# Patient Record
Sex: MALE | Race: WHITE | NOT HISPANIC OR LATINO | Employment: FULL TIME | URBAN - NONMETROPOLITAN AREA
[De-identification: names, ages, dates, MRNs, and addresses within clinical notes are randomized per-mention and may not be internally consistent; named-entity substitution may affect disease eponyms.]

---

## 2018-08-11 ENCOUNTER — HOSPITAL ENCOUNTER (EMERGENCY)
Facility: HOSPITAL | Age: 48
Discharge: HOME | End: 2018-08-11
Attending: STUDENT IN AN ORGANIZED HEALTH CARE EDUCATION/TRAINING PROGRAM | Admitting: STUDENT IN AN ORGANIZED HEALTH CARE EDUCATION/TRAINING PROGRAM

## 2018-08-11 ENCOUNTER — APPOINTMENT (OUTPATIENT)
Dept: CT IMAGING | Facility: HOSPITAL | Age: 48
End: 2018-08-11

## 2018-08-11 VITALS
DIASTOLIC BLOOD PRESSURE: 96 MMHG | SYSTOLIC BLOOD PRESSURE: 163 MMHG | HEIGHT: 74 IN | OXYGEN SATURATION: 98 % | TEMPERATURE: 99 F | HEART RATE: 83 BPM | RESPIRATION RATE: 18 BRPM | BODY MASS INDEX: 40.43 KG/M2 | WEIGHT: 315 LBS

## 2018-08-11 DIAGNOSIS — R10.9 NON-SURGICAL ABDOMINAL PAIN: Primary | ICD-10-CM

## 2018-08-11 PROCEDURE — 99282 EMERGENCY DEPT VISIT SF MDM: CPT

## 2018-08-11 PROCEDURE — 81003 URINALYSIS AUTO W/O SCOPE: CPT | Performed by: STUDENT IN AN ORGANIZED HEALTH CARE EDUCATION/TRAINING PROGRAM

## 2018-08-11 PROCEDURE — 85025 COMPLETE CBC W/AUTO DIFF WBC: CPT | Performed by: STUDENT IN AN ORGANIZED HEALTH CARE EDUCATION/TRAINING PROGRAM

## 2018-08-11 PROCEDURE — 74176 CT ABD & PELVIS W/O CONTRAST: CPT

## 2018-08-11 PROCEDURE — 80053 COMPREHEN METABOLIC PANEL: CPT | Performed by: STUDENT IN AN ORGANIZED HEALTH CARE EDUCATION/TRAINING PROGRAM

## 2018-08-12 LAB
ALBUMIN SERPL-MCNC: 4.5 G/DL (ref 3.5–5)
ALBUMIN/GLOB SERPL: 1.4 G/DL (ref 1–2)
ALP SERPL-CCNC: 76 U/L (ref 38–126)
ALT SERPL W P-5'-P-CCNC: 34 U/L (ref 13–69)
ANION GAP SERPL CALCULATED.3IONS-SCNC: 12.8 MMOL/L (ref 10–20)
AST SERPL-CCNC: 25 U/L (ref 15–46)
BASOPHILS # BLD AUTO: 0.04 10*3/MM3 (ref 0–0.2)
BASOPHILS NFR BLD AUTO: 0.4 % (ref 0–2.5)
BILIRUB SERPL-MCNC: 1.9 MG/DL (ref 0.2–1.3)
BILIRUB UR QL STRIP: NEGATIVE
BUN BLD-MCNC: 16 MG/DL (ref 7–20)
BUN/CREAT SERPL: 20 (ref 6.3–21.9)
CALCIUM SPEC-SCNC: 9.5 MG/DL (ref 8.4–10.2)
CHLORIDE SERPL-SCNC: 99 MMOL/L (ref 98–107)
CLARITY UR: CLEAR
CO2 SERPL-SCNC: 28 MMOL/L (ref 26–30)
COLOR UR: YELLOW
CREAT BLD-MCNC: 0.8 MG/DL (ref 0.6–1.3)
DEPRECATED RDW RBC AUTO: 42 FL (ref 37–54)
EOSINOPHIL # BLD AUTO: 0.14 10*3/MM3 (ref 0–0.7)
EOSINOPHIL NFR BLD AUTO: 1.2 % (ref 0–7)
ERYTHROCYTE [DISTWIDTH] IN BLOOD BY AUTOMATED COUNT: 12.2 % (ref 11.5–14.5)
GFR SERPL CREATININE-BSD FRML MDRD: 103 ML/MIN/1.73
GLOBULIN UR ELPH-MCNC: 3.2 GM/DL
GLUCOSE BLD-MCNC: 108 MG/DL (ref 74–98)
GLUCOSE UR STRIP-MCNC: NEGATIVE MG/DL
HCT VFR BLD AUTO: 43.8 % (ref 42–52)
HGB BLD-MCNC: 15.5 G/DL (ref 14–18)
HGB UR QL STRIP.AUTO: NEGATIVE
IMM GRANULOCYTES # BLD: 0.04 10*3/MM3 (ref 0–0.06)
IMM GRANULOCYTES NFR BLD: 0.4 % (ref 0–0.6)
KETONES UR QL STRIP: NEGATIVE
LEUKOCYTE ESTERASE UR QL STRIP.AUTO: NEGATIVE
LYMPHOCYTES # BLD AUTO: 4.43 10*3/MM3 (ref 0.6–3.4)
LYMPHOCYTES NFR BLD AUTO: 38.9 % (ref 10–50)
MCH RBC QN AUTO: 32.8 PG (ref 27–31)
MCHC RBC AUTO-ENTMCNC: 35.4 G/DL (ref 30–37)
MCV RBC AUTO: 92.6 FL (ref 80–94)
MONOCYTES # BLD AUTO: 0.82 10*3/MM3 (ref 0–0.9)
MONOCYTES NFR BLD AUTO: 7.2 % (ref 0–12)
NEUTROPHILS # BLD AUTO: 5.92 10*3/MM3 (ref 2–6.9)
NEUTROPHILS NFR BLD AUTO: 51.9 % (ref 37–80)
NITRITE UR QL STRIP: NEGATIVE
NRBC BLD MANUAL-RTO: 0 /100 WBC (ref 0–0)
PH UR STRIP.AUTO: 5.5 [PH] (ref 5–8)
PLATELET # BLD AUTO: 215 10*3/MM3 (ref 130–400)
PMV BLD AUTO: 9.1 FL (ref 6–12)
POTASSIUM BLD-SCNC: 3.8 MMOL/L (ref 3.5–5.1)
PROT SERPL-MCNC: 7.7 G/DL (ref 6.3–8.2)
PROT UR QL STRIP: NEGATIVE
RBC # BLD AUTO: 4.73 10*6/MM3 (ref 4.7–6.1)
SODIUM BLD-SCNC: 136 MMOL/L (ref 137–145)
SP GR UR STRIP: >=1.03 (ref 1–1.03)
UROBILINOGEN UR QL STRIP: NORMAL
WBC NRBC COR # BLD: 11.39 10*3/MM3 (ref 4.8–10.8)

## 2018-08-26 NOTE — ED PROVIDER NOTES
Subjective   Patient's 48-year-old male presents with concerns for appendicitis.  Patient states he is had sharp right-sided abdominal pain for the past 2-3 days.  States sometimes he feels it in his back.  He does have an umbilical hernia but is not what is causing his pain.  States pain has made it difficult for him to sleep.  Nothing makes the pain better or worse.            Review of Systems   All other systems reviewed and are negative.      History reviewed. No pertinent past medical history.    No Known Allergies    History reviewed. No pertinent surgical history.    History reviewed. No pertinent family history.    Social History     Social History   • Marital status: Single     Social History Main Topics   • Smoking status: Never Smoker   • Alcohol use No   • Drug use: No     Other Topics Concern   • Not on file           Objective   Physical Exam   Nursing note and vitals reviewed.    GEN: No acute distress  Head: Normocephalic, atraumatic  Eyes: Pupils equal round reactive to light  ENT: Posterior pharynx normal in appearance, oral mucosa is moist  Chest: Nontender to palpation  Cardiovascular: Regular rate  Lungs: Clear to auscultation bilaterally  Abdomen: Soft, mildly tender to palpation in the right upper quadrant otherwise nontender, repeat right lower quadrant and left lower quadrant are nontender, nondistended, no peritoneal signs  Extremities: No edema, normal appearance  Neuro: GCS 15  Psych: Mood and affect are appropriate    Procedures           ED Course                  MDM  Number of Diagnoses or Management Options  Diagnosis management comments:    CT Abdomen Pelvis Without Contrast (Final result)   Result time 08/11/18 23:15:22   Final result by Emanuel Flores MD (08/11/18 23:15:22)             Impression:     No acute abnormality    Authenticated by Emanuel Flores MD on 08/11/2018 11:15:22 PM        Narrative:     FINAL REPORT    TECHNIQUE:  Multiple contiguous transaxial slices  through the abdomen and  pelvis were obtained without the intravenous administration of  contrast.    CLINICAL HISTORY:  right sided abdominal pain, kidney stone?    FINDINGS:  There is no renal or ureteral stone or hydronephrosis. The  bladder is normal in contour.  The liver, gallbladder, spleen,  pancreas and adrenal glands appear normal. The bowel gas pattern  is nonobstructive. No focal inflammatory changes are present.  The appendix appears normal.  There are subumbilical hernia  containing fat.      While awaiting blood work after CT scan the patient eloped.  I did tell him about his CT scan results and he did seem disappointed that he did not have appendicitis.  Blood work was pending at the time         Amount and/or Complexity of Data Reviewed  Clinical lab tests: reviewed  Decide to obtain previous medical records or to obtain history from someone other than the patient: yes  Obtain history from someone other than the patient: yes  Review and summarize past medical records: yes  Independent visualization of images, tracings, or specimens: yes          Final diagnoses:   Non-surgical abdominal pain            Gonzales Jackman MD  08/26/18 0714

## 2024-06-23 ENCOUNTER — HOSPITAL ENCOUNTER (EMERGENCY)
Facility: HOSPITAL | Age: 54
Discharge: HOME | End: 2024-06-23
Attending: STUDENT IN AN ORGANIZED HEALTH CARE EDUCATION/TRAINING PROGRAM | Admitting: STUDENT IN AN ORGANIZED HEALTH CARE EDUCATION/TRAINING PROGRAM
Payer: COMMERCIAL

## 2024-06-23 ENCOUNTER — APPOINTMENT (OUTPATIENT)
Dept: CT IMAGING | Facility: HOSPITAL | Age: 54
End: 2024-06-23
Payer: COMMERCIAL

## 2024-06-23 VITALS
DIASTOLIC BLOOD PRESSURE: 92 MMHG | OXYGEN SATURATION: 96 % | RESPIRATION RATE: 16 BRPM | SYSTOLIC BLOOD PRESSURE: 163 MMHG | HEART RATE: 70 BPM | WEIGHT: 299 LBS | TEMPERATURE: 97.8 F | BODY MASS INDEX: 38.37 KG/M2 | HEIGHT: 74 IN

## 2024-06-23 DIAGNOSIS — M54.2 NECK PAIN: Primary | ICD-10-CM

## 2024-06-23 DIAGNOSIS — M54.12 CERVICAL RADICULOPATHY: ICD-10-CM

## 2024-06-23 PROCEDURE — 72125 CT NECK SPINE W/O DYE: CPT

## 2024-06-23 PROCEDURE — 99284 EMERGENCY DEPT VISIT MOD MDM: CPT

## 2024-06-23 RX ORDER — CYCLOBENZAPRINE HCL 10 MG
10 TABLET ORAL 3 TIMES DAILY PRN
Qty: 21 TABLET | Refills: 0 | Status: SHIPPED | OUTPATIENT
Start: 2024-06-23

## 2024-06-23 RX ORDER — PREDNISONE 20 MG/1
40 TABLET ORAL DAILY
Qty: 10 TABLET | Refills: 0 | Status: SHIPPED | OUTPATIENT
Start: 2024-06-23 | End: 2024-06-28
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
## 2024-06-27 ENCOUNTER — LAB (OUTPATIENT)
Dept: LAB | Facility: HOSPITAL | Age: 54
End: 2024-06-27
Payer: COMMERCIAL

## 2024-06-27 ENCOUNTER — OFFICE VISIT (OUTPATIENT)
Dept: PSYCHIATRY | Facility: CLINIC | Age: 54
End: 2024-06-27
Payer: COMMERCIAL

## 2024-06-27 VITALS
SYSTOLIC BLOOD PRESSURE: 154 MMHG | HEART RATE: 74 BPM | BODY MASS INDEX: 38.5 KG/M2 | WEIGHT: 300 LBS | OXYGEN SATURATION: 95 % | HEIGHT: 74 IN | DIASTOLIC BLOOD PRESSURE: 76 MMHG

## 2024-06-27 DIAGNOSIS — F12.20 CANNABIS USE DISORDER, MODERATE, DEPENDENCE: ICD-10-CM

## 2024-06-27 DIAGNOSIS — F10.20 ALCOHOL USE DISORDER, MODERATE, DEPENDENCE: ICD-10-CM

## 2024-06-27 DIAGNOSIS — F15.20 METHAMPHETAMINE USE DISORDER, MODERATE: Primary | ICD-10-CM

## 2024-06-27 DIAGNOSIS — F15.20 METHAMPHETAMINE USE DISORDER, MODERATE: ICD-10-CM

## 2024-06-27 LAB
AMPHET+METHAMPHET UR QL: NEGATIVE
AMPHETAMINES UR QL: NEGATIVE
BARBITURATES UR QL SCN: NEGATIVE
BENZODIAZ UR QL SCN: NEGATIVE
BUPRENORPHINE SERPL-MCNC: NEGATIVE NG/ML
CANNABINOIDS SERPL QL: NEGATIVE
COCAINE UR QL: NEGATIVE
METHADONE UR QL SCN: NEGATIVE
OPIATES UR QL: NEGATIVE
OXYCODONE UR QL SCN: NEGATIVE
PCP UR QL SCN: NEGATIVE
TRICYCLICS UR QL SCN: NEGATIVE

## 2024-06-27 PROCEDURE — 80306 DRUG TEST PRSMV INSTRMNT: CPT

## 2024-06-27 NOTE — PROGRESS NOTES
"     New Patient Office Visit        Patient Name: Pieter William  : 1970   MRN: [id number]     Referring Provider: Provider, No Known    Chief Complaint: Substance use    History of Present Illness:   Pieter William is a 54 y.o. male who is here today for initial evaluation with provider related to substance use. Initial substance at age 14 with marijuana.  Use slowly increased over time and was smoking daily by college age.  At peak was smoking 2-3 joints per day.  Currently smokes small amount very seldom with last intake about 2 months ago.  Initially reports alcohol use was social and seldom.  When reviewing the DSM-V criteria with him, alcohol use seems to be more problematic than initially reported.  Currently drinks very seldom with last intake 2024.  Methamphetamine use began around age 49.  Peaked at moping a quarter gram 3 to 4 days a week and continued at this pace until 2024.  Denies any intake since then.  Never cigarette smoker.  Denies any current cravings for any substance.  Cravings in the past for methamphetamine have been triggered by fatigue and needing a shot of energy.  Denies any previous DIAN treatment.  Identifies sober support as his ex-wife.  Motivated to sobriety by \"my family \".    Denies any previous psychiatric history.  No prior pharmacological intervention in the past.  Feels mood overall is good and sleeps well most nights.  Has some situational stressors but is coping well overall.  Denies prior psychiatric hospitalizations. Denies SI/HI, AVH.  No family history of DIAN or mental health issues.    Denies any significant past medical history.  Currently does not have a PCP but would like to schedule for an annual wellness exam and screenings.     Currently lives in [place] with his ex-wife.  Has 1 grown stepdaughter and 2 grandchildren that he has a close relationship with.  Born in [facility] and raised by both parents.  Has 6 siblings.  He is fifth " in the birth order. Highest level of education completed was bachelor's degree in history from [employer].  Not currently employed.  Was laid off on Tuesday. License is active and has a vehicle. Current legal issues was arrested on 5/2024 on a failure to appear from [facility].  Had methamphetamine on him at the time and was charged with possession of methamphetamine.  [facility] case was dismissed.  Another case from [facility] was dismissed.  Current charges are only drug or alcohol related charges.  Charges are out of [facility].  is Mario Lanza. Next court date is 7/17/2024.    Triggers: Fatigue    Cravings: Denies any current cravings for any substance    Relapse Prevention: IOP, recovery meetings, peer support    Urine Drug Screen (today's visit) discussed: Ordered, will go after visit today    UDS Confirmation: N/A    EDGARDO (PDMP) Reviewed for Current/Active Medications: No active medication    DSM 5 Substance Use Disorder Checklist     Diagnostic Criteria   (Substance use disorder requires at least 2 criteria be met within 12 month period)   Meets Criteria          Yes / No  Notes/Supporting Information    Substance often taken in larger amounts or over a longer period than intended.  yes Methamphetamine (current)  Marijuana and alcohol (in the past)   2.  There is a persistent desire or unsuccessful effort to cut        down or control th substance use.  no    3.  A great deal of time is spent in activities necessary to        obtain the substance, use the substance, or recover        from its effects.  no    4.  Craving or a strong desire to use the substance.  yes Methamphetamine only   5.  Recurrent substance use resulting in failure to fulfill        major role obligations at work, school, or home.  yes Methamphetamine (current)  Marijuana and alcohol (in the past)   6.  Continued substance use despite having persistent or         recurrent social or interpersonal problems  caused or         exacerbated by the effects of the substance.  yes Methamphetamine (current)  Marijuana and alcohol (in the past)   7.   Important social, occupational or recreational activities        are given up or reduced because of substance use.  no    8.   Recurrent substance use in situations in which it is        physically hazardous.  no    9.  Continued use despite knowledge of having a         persistent or recurrent physical or psychological         problem that is likely to have been caused or        exacerbated by the substance.  yes Marijuana and alcohol (in the past)   10. * Tolerance, as defined by either of the following:          a. A need for markedly increased amounts of the              Substance to achieve intoxication or desired effect.          b. Markedly diminished effect with continued use of              The same substance.  yes Methamphetamine (current)  Marijuana and alcohol (in the past)   11.  * Withdrawal, as manifested by either of the following:         a. The characteristic withdrawal for the substance.          b. The same (or closely related) substance is taken to               Relieve or avoid withdrawal symptoms.  no    **This criterion is not considered to be met for those individuals taking prescriptions opiates solely under medical supervision. **   Severity: Mild: 2-3 symptoms, Moderate: 4-5 symptoms, Severe: 6 or more symptoms.          Past Surgical History:  History reviewed. No pertinent surgical history.    Problem List:  There is no problem list on file for this patient.      Allergy:   No Known Allergies     Current Medications:   Current Outpatient Medications   Medication Sig Dispense Refill    cyclobenzaprine (FLEXERIL) 10 MG tablet Take 1 tablet by mouth 3 (Three) Times a Day As Needed for Muscle Spasms. 21 tablet 0    predniSONE (DELTASONE) 20 MG tablet Take 2 tablets by mouth Daily for 5 days. 10 tablet 0     No current facility-administered medications for  this visit.       Past Medical History:  History reviewed. No pertinent past medical history.    Social History:  Social History     Socioeconomic History    Marital status: Single   Tobacco Use    Smoking status: Never     Passive exposure: Never    Smokeless tobacco: Never   Vaping Use    Vaping status: Never Used   Substance and Sexual Activity    Alcohol use: No    Drug use: No    Sexual activity: Defer       Family History:  History reviewed. No pertinent family history.      Subjective      Review of Systems:   Review of Systems   Constitutional:  Negative for chills, fatigue and fever.   Respiratory:  Negative for shortness of breath.    Cardiovascular:  Negative for chest pain.   Gastrointestinal:  Negative for abdominal pain.   Skin:  Negative for skin lesions.   Neurological:  Negative for seizures and confusion.   Psychiatric/Behavioral:  Positive for stress. Negative for hallucinations, sleep disturbance, suicidal ideas and depressed mood. The patient is not nervous/anxious.        PHQ-9 Depression Screening  Little interest or pleasure in doing things? 0-->not at all   Feeling down, depressed, or hopeless? 0-->not at all   Trouble falling or staying asleep, or sleeping too much? 0-->not at all   Feeling tired or having little energy? 0-->not at all   Poor appetite or overeating? 0-->not at all   Feeling bad about yourself - or that you are a failure or have let yourself or your family down? 0-->not at all   Trouble concentrating on things, such as reading the newspaper or watching television? 0-->not at all   Moving or speaking so slowly that other people could have noticed? Or the opposite - being so fidgety or restless that you have been moving around a lot more than usual? 0-->not at all   Thoughts that you would be better off dead, or of hurting yourself in some way? 0-->not at all   PHQ-9 Total Score 0   If you checked off any problems, how difficult have these problems made it for you to do your  work, take care of things at home, or get along with other people? not difficult at all       IRINEO-7 Score:   Feeling nervous, anxious or on edge: Not at all  Not being able to stop or control worrying: Not at all  Worrying too much about different things: Not at all  Trouble Relaxing: Not at all  Being so restless that it is hard to sit still: Not at all  Feeling afraid as if something awful might happen: Not at all  Becoming easily annoyed or irritable: Not at all  IRINEO 7 Total Score: 0  If you checked any problems, how difficult have these problems made it for you to do your work, take care of things at home, or get along with other people: Not difficult at all    Patient History:   The following portions of the patient's history were reviewed and updated as appropriate: allergies, current medications, past family history, past medical history, past social history, past surgical history and problem list.     Social:  Social History     Socioeconomic History    Marital status: Single   Tobacco Use    Smoking status: Never     Passive exposure: Never    Smokeless tobacco: Never   Vaping Use    Vaping status: Never Used   Substance and Sexual Activity    Alcohol use: No    Drug use: No    Sexual activity: Defer       Medications:     Current Outpatient Medications:     cyclobenzaprine (FLEXERIL) 10 MG tablet, Take 1 tablet by mouth 3 (Three) Times a Day As Needed for Muscle Spasms., Disp: 21 tablet, Rfl: 0    predniSONE (DELTASONE) 20 MG tablet, Take 2 tablets by mouth Daily for 5 days., Disp: 10 tablet, Rfl: 0    Objective     Physical Exam  Vitals reviewed.   Constitutional:       General: He is not in acute distress.     Appearance: He is well-developed. He is not ill-appearing.   Pulmonary:      Effort: No respiratory distress.   Neurological:      Mental Status: He is alert and oriented to person, place, and time.      Gait: Gait normal.   Psychiatric:         Attention and Perception: Attention normal.          "Mood and Affect: Mood and affect normal.         Speech: Speech normal.         Behavior: Behavior normal. Behavior is cooperative.         Thought Content: Thought content is not paranoid or delusional. Thought content does not include homicidal or suicidal ideation. Thought content does not include homicidal or suicidal plan.         Cognition and Memory: Cognition and memory normal.         Vital Signs:   Vitals:    06/27/24 1057   BP: 154/76   Pulse: 74   SpO2: 95%   Weight: 136 kg (300 lb)   Height: 188 cm (74\")     Body mass index is 38.52 kg/m².     Mental Status Exam:   Hygiene:   good  Cooperation:  Cooperative  Eye Contact:  Good  Psychomotor Behavior:  Restless  Affect:  Full range  Mood: normal  Speech:  Normal  Thought Process:  Goal directed  Thought Content:  Normal  Suicidal:  None  Homicidal:  None  Hallucinations:  None  Delusion:  None  Memory:  Intact  Orientation:  Person, Place, Time, and Situation  Reliability:  fair  Insight:  Fair  Judgement:  Fair  Impulse Control:  Fair    Assessment / Plan      -This is my initial evaluation with Pieter. Based on self-reported substance use history and current symptom burden, CD-IOP has been advised and screener appointment scheduled with Fabián Martinez LCSW to complete the intake process.   -Encouraged to take part in and remain active in 12 Step Recovery Meetings, IOP, and/or 1:1 therapy/counseling and to establish/maintain an active relationship with a recovery sponsor as part of their long-term recovery care. Recovery meeting list for [facility]. Discussed other recovery related materials.  -Instructed to go to lab today to complete baseline UDS. Agreeable to continued monitoring and will request confirmations with levels on all preliminary positive results for patient safety, medication compliance, adherence to treatment plan, toxicity monitoring (when applicable), and planning of future care.   -RICARDO signed for [facility] and referral placed for " peer support. Declines TCM. Insurance support contact info given  -RICARDO signed for [facility] and private atty.  -Narcan sample declined.   -Dr. Sullivan's contact info given and he will schedule to establish care and wellness exam with preventative screenings.  -Feels he is coping well with situational stressors.  Denies any need for pharmacological intervention for mood.  Is aware that individual therapy services are available to him at any time.      Assessment & Plan   Problems Addressed this Visit    None  Visit Diagnoses       Methamphetamine use disorder, moderate    -  Primary    Relevant Orders    Urine Drug Screen - Supervised - Urine, Clean Catch    [facility] Tox - Urine, Clean Catch    Cannabis use disorder, moderate, dependence        Relevant Orders    Urine Drug Screen - Supervised - Urine, Clean Catch    [facility] Tox - Urine, Clean Catch    Alcohol use disorder, moderate, dependence        Relevant Orders    Urine Drug Screen - Supervised - Urine, Clean Catch    [facility] Tox - Urine, Clean Catch          Diagnoses         Codes Comments    Methamphetamine use disorder, moderate    -  Primary ICD-10-CM: F15.20  ICD-9-CM: 304.40     Cannabis use disorder, moderate, dependence     ICD-10-CM: F12.20  ICD-9-CM: 304.30     Alcohol use disorder, moderate, dependence     ICD-10-CM: F10.20  ICD-9-CM: 303.90             Visit Diagnoses:    ICD-10-CM ICD-9-CM   1. Methamphetamine use disorder, moderate  F15.20 304.40   2. Cannabis use disorder, moderate, dependence  F12.20 304.30   3. Alcohol use disorder, moderate, dependence  F10.20 303.90       PLAN:  Safety: No acute safety concerns  Risk Assessment: Risk of self-harm acutely is low. Risk of self-harm chronically is also low, but could be further elevated in the event of treatment noncompliance and/or AODA.    TREATMENT PLAN: Continue supportive psychotherapy efforts and  medications as indicated. Treatment and medication options discussed during today's visit. Patient acknowledged and verbally consented to continue with current treatment plan and was educated on the importance of compliance with treatment and follow-up appointments.    GOALS:  Short Term Goals: Patient will be compliant with medication, and patient will have no significant medication related side effects.  Patient will be engaged in psychotherapy as indicated.  Patient will report subjective improvement of symptoms.  Long term goals: To stabilize mood and treat/improve subjective symptoms, the patient will stay out of the hospital, the patient will be at an optimal level of functioning, and the patient will take all medications as prescribed.  The patient/guardian verbalized understanding and agreement with goals that were mutually set.    MEDICATION ISSUES:  EDGARDO reviewed as expected.  Discussed medication options and treatment plan of prescribed medication as well as the risks, benefits, and side effects including potential falls, possible impaired driving and metabolic adversities among others. Patient is agreeable to call the office with any worsening of symptoms or onset of side effects. Patient is agreeable to call 911 or go to the nearest ER should he/she begin having SI/HI. No medication side effects or related complaints today.       TOBACCO USE:  Never smoker    I advised Pieter William of the risks of tobacco use.     MEDS ORDERED DURING VISIT:  No orders of the defined types were placed in this encounter.      Return if symptoms worsen or fail to improve.                 This document has been electronically signed by SANCHEZ Riddle  June 27, 2024 11:46 EDT      Part of this note may be an electronic transcription/translation of spoken language to printed text using the Dragon Dictation System.

## 2024-07-02 LAB — REF LAB TEST METHOD: NORMAL

## 2024-07-18 ENCOUNTER — OFFICE VISIT (OUTPATIENT)
Dept: PSYCHIATRY | Facility: CLINIC | Age: 54
End: 2024-07-18

## 2024-07-18 DIAGNOSIS — F12.20 MODERATE CANNABIS USE DISORDER: ICD-10-CM

## 2024-07-18 DIAGNOSIS — F15.20 MODERATE METHAMPHETAMINE USE DISORDER: ICD-10-CM

## 2024-07-18 DIAGNOSIS — F10.20 MODERATE ALCOHOL USE DISORDER: Primary | ICD-10-CM

## 2024-07-18 PROCEDURE — 90791 PSYCH DIAGNOSTIC EVALUATION: CPT | Performed by: SOCIAL WORKER
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
## 2024-08-01 ENCOUNTER — APPOINTMENT (OUTPATIENT)
Dept: PSYCHIATRY | Facility: HOSPITAL | Age: 54
End: 2024-08-01
Payer: COMMERCIAL

## 2024-08-05 ENCOUNTER — OFFICE VISIT (OUTPATIENT)
Dept: PSYCHIATRY | Facility: HOSPITAL | Age: 54
End: 2024-08-05
Payer: COMMERCIAL

## 2024-08-05 DIAGNOSIS — F15.20 MODERATE METHAMPHETAMINE USE DISORDER: ICD-10-CM

## 2024-08-05 DIAGNOSIS — F10.20 MODERATE ALCOHOL USE DISORDER: Primary | ICD-10-CM

## 2024-08-05 DIAGNOSIS — F12.20 MODERATE CANNABIS USE DISORDER: ICD-10-CM

## 2024-08-05 PROCEDURE — H0015 ALCOHOL AND/OR DRUG SERVICES: HCPCS | Performed by: COUNSELOR

## 2024-08-07 ENCOUNTER — OFFICE VISIT (OUTPATIENT)
Dept: PSYCHIATRY | Facility: HOSPITAL | Age: 54
End: 2024-08-07
Payer: COMMERCIAL

## 2024-08-07 DIAGNOSIS — F10.20 MODERATE ALCOHOL USE DISORDER: Primary | ICD-10-CM

## 2024-08-07 DIAGNOSIS — F15.20 MODERATE METHAMPHETAMINE USE DISORDER: ICD-10-CM

## 2024-08-07 DIAGNOSIS — F12.20 MODERATE CANNABIS USE DISORDER: ICD-10-CM

## 2024-08-07 PROCEDURE — H0015 ALCOHOL AND/OR DRUG SERVICES: HCPCS | Performed by: NURSE PRACTITIONER

## 2024-08-08 ENCOUNTER — OFFICE VISIT (OUTPATIENT)
Dept: PSYCHIATRY | Facility: HOSPITAL | Age: 54
End: 2024-08-08
Payer: COMMERCIAL

## 2024-08-08 ENCOUNTER — LAB (OUTPATIENT)
Dept: LAB | Facility: HOSPITAL | Age: 54
End: 2024-08-08
Payer: COMMERCIAL

## 2024-08-08 DIAGNOSIS — F10.20 MODERATE ALCOHOL USE DISORDER: Primary | ICD-10-CM

## 2024-08-08 DIAGNOSIS — F15.20 METHAMPHETAMINE USE DISORDER, MODERATE: ICD-10-CM

## 2024-08-08 DIAGNOSIS — F10.20 ALCOHOL USE DISORDER, MODERATE, DEPENDENCE: ICD-10-CM

## 2024-08-08 DIAGNOSIS — F15.20 MODERATE METHAMPHETAMINE USE DISORDER: ICD-10-CM

## 2024-08-08 DIAGNOSIS — F12.20 CANNABIS USE DISORDER, MODERATE, DEPENDENCE: ICD-10-CM

## 2024-08-08 DIAGNOSIS — F12.20 MODERATE CANNABIS USE DISORDER: ICD-10-CM

## 2024-08-08 PROCEDURE — 80306 DRUG TEST PRSMV INSTRMNT: CPT

## 2024-08-08 PROCEDURE — H0015 ALCOHOL AND/OR DRUG SERVICES: HCPCS | Performed by: NURSE PRACTITIONER

## 2024-08-08 NOTE — PROGRESS NOTES
[facility] PHASE I GROUP NOTE     Name: Pieter William  Date: 08/05/2024    Time In: 6:00  Time Out: 9:00     Number of participants: 11    Data: 3 hour IOP group therapy session (Check-ins, team building activity, and triggers to substances)     Check Ins: Therapist continued facilitation of rapport building strategies between group members. Therapist asked that each patient check in with home life and recovery efforts and identify triggers, cravings, and high risk situations that arise between group sessions. Therapist provided empathy and support during group session.     Session Content/Coping Skills: [facility]  attended for first part of session and introduced himself and discussed his role on the [facility] treatment team. Check ins completed by group members. Group members participated in a group profile activity to build group cohesion. Mirza Pond  read the daily reflection. Clinician facilitated a team building activity about looking for the good in the bad and positive affirmation. Clinician facilitated discussion with group regarding triggers.        Response: Patient attended class in person. Patient participated in completion of check in form. Patient on check in form answered no to question of “currently or since your last group meeting, have you had any suicidal thoughts or plan or intent to hurt yourself”, and patient also answered no on check in form to question of “currently or since your last group meeting, have you had any homicidal thoughts or plan or intent to hurt others”. Patient on check in form reported concerns with appetite and medication concerns since last group meeting.     Personal Assessment: 0-10 Scale (10 worst)    Anxiety:  0   Depression:  0   Cravings: 0     Assessment:     ..  No visits with results within 3 Week(s) from this visit.   Latest known visit with results is:   Lab on 06/27/2024   Component Date Value  Ref Range Status    THC, Screen, Urine 06/27/2024 Negative  Negative Final    Phencyclidine (PCP), Urine 06/27/2024 Negative  Negative Final    Cocaine Screen, Urine 06/27/2024 Negative  Negative Final    Methamphetamine, Ur 06/27/2024 Negative  Negative Final    Opiate Screen 06/27/2024 Negative  Negative Final    Amphetamine Screen, Urine 06/27/2024 Negative  Negative Final    Benzodiazepine Screen, Urine 06/27/2024 Negative  Negative Final    Tricyclic Antidepressants Screen 06/27/2024 Negative  Negative Final    Methadone Screen, Urine 06/27/2024 Negative  Negative Final    Barbiturates Screen, Urine 06/27/2024 Negative  Negative Final    Oxycodone Screen, Urine 06/27/2024 Negative  Negative Final    Buprenorphine, Screen, Urine 06/27/2024 Negative  Negative Final       Mental Status Exam:  Hygiene:  good  Dress: casual  Attitude: cooperative and agreeable   Motor Activity: appropriate  Eye Contact:  good  Speech: regular rate and rhythm   Mood:  calm and cooperative  Affect:  Appropriate  Thought Processes:  Linear  Thought Content:  Mood congruent  Suicidal Thoughts:  denies  Homicidal Thoughts:  denies  Crisis Safety Plan: yes, to come to the emergency room.  Hallucinations:  denies  Reliability: fair  Insight: fair  Judgement: fair  Impulse Control: fair    Recovery/spiritual support group attendance: 0 group meetings     Sponsor: No      Progress toward goal: Not at goal    Prognosis: Fair with Ongoing Treatment     Self-reported number of days sober: Patient's check in sheet reports 71 days    Patient will contact this office, call 911 or present to the nearest emergency room should suicidal or homicidal ideations occur.    Impression/Formulation:    ICD-10-CM ICD-9-CM   1. Moderate alcohol use disorder  F10.20 303.90   2. Moderate cannabis use disorder  F12.20 304.30   3. Moderate methamphetamine use disorder  F15.20 304.40       Clinical Maneuvering/Interventions:Therapist utilized a person-centered  approach to build rapport with group member.  Therapist implemented motivational interviewing techniques to assist client with exploring and resolving ambivalence associated with commitment to change behaviors related to substance use and addiction.  Therapist applied cognitive behavioral strategies to facilitate identification of maladaptive patterns of thinking and behavior that contribute to client's risk for continued substance use and relapse.  Therapist employed group interaction activities to build rapport among group members, promote sobriety, and emphasize relapse prevention.  Therapist promoted safe nonjudgmental environment by providing group members with unconditional positive regard and encouraging group members to comply with group rules and guidelines. Therapist assisted group member with identifying and implementing healthier coping strategies.      Plan:  Continue [facility] IOP Phase I   Aftercare:  [facility] Phase II  Program Assignments:  Personal recovery plan, relapse prevention plan, attendance of recovery support group meetings, exploration of sponsorship, drug/alcohol screens.        TUCKER Rider  [unfilled]  08:40 EDT

## 2024-08-12 ENCOUNTER — OFFICE VISIT (OUTPATIENT)
Dept: PSYCHIATRY | Facility: HOSPITAL | Age: 54
End: 2024-08-12
Payer: COMMERCIAL

## 2024-08-12 DIAGNOSIS — F10.20 MODERATE ALCOHOL USE DISORDER: Primary | ICD-10-CM

## 2024-08-12 DIAGNOSIS — F12.20 MODERATE CANNABIS USE DISORDER: ICD-10-CM

## 2024-08-12 DIAGNOSIS — F15.20 MODERATE METHAMPHETAMINE USE DISORDER: ICD-10-CM

## 2024-08-12 PROCEDURE — H0015 ALCOHOL AND/OR DRUG SERVICES: HCPCS | Performed by: NURSE PRACTITIONER

## 2024-08-14 ENCOUNTER — OFFICE VISIT (OUTPATIENT)
Dept: PSYCHIATRY | Facility: HOSPITAL | Age: 54
End: 2024-08-14
Payer: COMMERCIAL

## 2024-08-14 ENCOUNTER — LAB (OUTPATIENT)
Dept: LAB | Facility: HOSPITAL | Age: 54
End: 2024-08-14
Payer: COMMERCIAL

## 2024-08-14 DIAGNOSIS — F15.20 METHAMPHETAMINE USE DISORDER, MODERATE: ICD-10-CM

## 2024-08-14 DIAGNOSIS — F12.20 CANNABIS USE DISORDER, MODERATE, DEPENDENCE: ICD-10-CM

## 2024-08-14 DIAGNOSIS — F10.20 ALCOHOL USE DISORDER, MODERATE, DEPENDENCE: ICD-10-CM

## 2024-08-14 DIAGNOSIS — F12.20 MODERATE CANNABIS USE DISORDER: ICD-10-CM

## 2024-08-14 DIAGNOSIS — F10.20 MODERATE ALCOHOL USE DISORDER: Primary | ICD-10-CM

## 2024-08-14 DIAGNOSIS — F15.20 MODERATE METHAMPHETAMINE USE DISORDER: ICD-10-CM

## 2024-08-14 PROCEDURE — 80306 DRUG TEST PRSMV INSTRMNT: CPT

## 2024-08-14 PROCEDURE — H0015 ALCOHOL AND/OR DRUG SERVICES: HCPCS | Performed by: NURSE PRACTITIONER

## 2024-08-14 NOTE — PROGRESS NOTES
[facility] IOP GROUP     Date: 08/07/2024  Name: Pieter William    Time In: 1800   Time Out: 2043     Number of participants: 10    IOP GROUP NOTE     Data: 3 hour IOP group therapy session (Check-ins, Coping Skills, Relapse Prevention)     Check Ins: Therapist continued facilitation of rapport building strategies between group members. Therapist asked that each patient check in with home life and recovery efforts and identify triggers, cravings, and high risk situations that arise between group sessions. Therapist provided empathy and support during group session.     Session Content/Coping Skills:  attended for first part of session. Burton from Pharmacy presented on Narcan Education.  shared Just for Today reading.  facilitated discussion of gratitudes and group members shared their gratitudes. Check ins completed by group members. Cravings-Basic Principles- Page one reviewed and discussed (retrieved from taking the escalator). [place] teambuilding activity completed by group members. Clinician discussed with group members the importance of working as a team.     Response: Patient attended class in person. Patient participated in completion of check in form. Patient on check in form answered no to question of “currently or since your last group meeting, have you had any suicidal thoughts or plan or intent to hurt yourself”, and patient also answered no on check in form to question of “currently or since your last group meeting, have you had any homicidal thoughts or plan or intent to hurt others”.     Personal Assessment 0-10 Scale (0-none, 10-high)    Anxiety:  0   Depression:  0   Cravings: 0     Assessment:     ..  No visits with results within 3 Week(s) from this visit.   Latest known visit with results is:   Lab on 06/27/2024   Component Date Value Ref Range Status    THC, Screen, Urine 06/27/2024 Negative  Negative Final     Phencyclidine (PCP), Urine 06/27/2024 Negative  Negative Final    Cocaine Screen, Urine 06/27/2024 Negative  Negative Final    Methamphetamine, Ur 06/27/2024 Negative  Negative Final    Opiate Screen 06/27/2024 Negative  Negative Final    Amphetamine Screen, Urine 06/27/2024 Negative  Negative Final    Benzodiazepine Screen, Urine 06/27/2024 Negative  Negative Final    Tricyclic Antidepressants Screen 06/27/2024 Negative  Negative Final    Methadone Screen, Urine 06/27/2024 Negative  Negative Final    Barbiturates Screen, Urine 06/27/2024 Negative  Negative Final    Oxycodone Screen, Urine 06/27/2024 Negative  Negative Final    Buprenorphine, Screen, Urine 06/27/2024 Negative  Negative Final       Mental Status Exam  Hygiene:  good  Dress: casual  Attitude: cooperative and agreeable   Motor Activity: appropriate  Eye Contact:  good  Speech: regular rate and rhythm   Mood:  calm and cooperative  Affect:  Appropriate  Thought Processes:  Linear  Thought Content:  Normal  Suicidal Thoughts:  denies  Homicidal Thoughts:  denies  Crisis Safety Plan: Safety plan has been discussed.   Hallucinations:  Unknown to clinician.   Reliability: fair  Insight: fair  Judgement: fair  Impulse Control: fair    Recovery/spiritual support group attendance: No.      Progress toward goal: Not at goal    Prognosis: Fair with Ongoing Treatment     Self-reported number of days sober: Patient on check in form reported 73.     Patient will contact this office, call 911 or present to the nearest emergency room should suicidal or homicidal ideations occur.    Impression/Formulation:    ICD-10-CM ICD-9-CM   1. Moderate alcohol use disorder  F10.20 303.90   2. Moderate cannabis use disorder  F12.20 304.30   3. Moderate methamphetamine use disorder  F15.20 304.40       Clinical Maneuvering/Interventions: Therapist utilized a person-centered approach to build rapport with group member. Therapist implemented motivational interviewing techniques to  assist client with exploring and resolving ambivalence associated with commitment to change behaviors related to substance use and addiction. Therapist applied cognitive behavioral strategies to facilitate identification of maladaptive patterns of thinking and behavior that contribute to client's risk for continued substance use and relapse. Therapist employed group interaction activities to build rapport among group members, promote sobriety, and emphasize relapse prevention. Therapist promoted safe nonjudgmental environment by providing group members with unconditional positive regard and encouraging group members to comply with group rules and guidelines. Therapist assisted group member with identifying and implementing healthier coping strategies.      Plan:  Continue [facility] IOP Phase I   Aftercare:  [facility] Phase II  Program Assignments:  Personal recovery plan, relapse prevention plan, attendance of recovery support group meetings, exploration of sponsorship, drug/alcohol screens.     Fabián Martinez LCSW  8/14/2024  16:11 EDT

## 2024-08-15 NOTE — PROGRESS NOTES
[facility] IOP GROUP     Date: 08/08/2024  Name: Pieter William    Time In: 1800   Time Out: Approximately 2040     Number of participants: 10    IOP GROUP NOTE     Data: 3 hour IOP group therapy session (Check-ins, Coping Skills, Relapse Prevention)     Check Ins: Therapist continued facilitation of rapport building strategies between group members. Therapist asked that each patient check in with home life and recovery efforts and identify triggers, cravings, and high risk situations that arise between group sessions. Therapist provided empathy and support during group session.     Session Content/Coping Skills: , Dejah, and Mickey, [facility] Liaison attended for first part of session. Check ins completed by group members. Coping with Cravings- Pages 2 & 3 reviewed and discussed (retrieved from taking the escalator). Clinician explored coping skills with group members. Coping skills log provided. Group members listed coping skills that they could try over the weekend. Clinician showed group members where the phase 2 group of CD-IOP meets in the hospital.    Response: Patient attended class in person. Patient participated in completion of check in form. Patient on check in form answered no to question of “currently or since your last group meeting, have you had any suicidal thoughts or plan or intent to hurt yourself”, and patient also answered no on check in form to question of “currently or since your last group meeting, have you had any homicidal thoughts or plan or intent to hurt others”.     Personal Assessment 0-10 Scale (0-none, 10-high)    Anxiety:  0   Depression:  0   Cravings: 0     Assessment:     ..  Lab on 08/08/2024   Component Date Value Ref Range Status    THC, Screen, Urine 08/08/2024 Negative  Negative Final    Phencyclidine (PCP), Urine 08/08/2024 Negative  Negative Final    Cocaine Screen, Urine 08/08/2024 Negative  Negative Final     Methamphetamine, Ur 08/08/2024 Negative  Negative Final    Opiate Screen 08/08/2024 Negative  Negative Final    Amphetamine Screen, Urine 08/08/2024 Negative  Negative Final    Benzodiazepine Screen, Urine 08/08/2024 Negative  Negative Final    Tricyclic Antidepressants Screen 08/08/2024 Negative  Negative Final    Methadone Screen, Urine 08/08/2024 Negative  Negative Final    Barbiturates Screen, Urine 08/08/2024 Negative  Negative Final    Oxycodone Screen, Urine 08/08/2024 Negative  Negative Final    Buprenorphine, Screen, Urine 08/08/2024 Negative  Negative Final       Mental Status Exam  Hygiene:  good  Dress: casual  Attitude: cooperative and agreeable   Motor Activity: appropriate  Eye Contact:  good  Speech: regular rate and rhythm   Mood:  calm and cooperative  Affect:  Appropriate  Thought Processes:  Linear  Thought Content:  Normal  Suicidal Thoughts:  denies  Homicidal Thoughts:  denies  Crisis Safety Plan: Safety plan has been discussed.   Hallucinations:  Unknown to clinician.   Reliability: fair  Insight: fair  Judgement: fair  Impulse Control: fair    Recovery/spiritual support group attendance: No.      Progress toward goal: Not at goal    Prognosis: Fair with Ongoing Treatment     Self-reported number of days sober: Patient on check in form reported 74.     Patient will contact this office, call 911 or present to the nearest emergency room should suicidal or homicidal ideations occur.    Impression/Formulation:    ICD-10-CM ICD-9-CM   1. Moderate alcohol use disorder  F10.20 303.90   2. Moderate cannabis use disorder  F12.20 304.30   3. Moderate methamphetamine use disorder  F15.20 304.40       Clinical Maneuvering/Interventions: Therapist utilized a person-centered approach to build rapport with group member. Therapist implemented motivational interviewing techniques to assist client with exploring and resolving ambivalence associated with commitment to change behaviors related to substance  use and addiction. Therapist applied cognitive behavioral strategies to facilitate identification of maladaptive patterns of thinking and behavior that contribute to client's risk for continued substance use and relapse. Therapist employed group interaction activities to build rapport among group members, promote sobriety, and emphasize relapse prevention. Therapist promoted safe nonjudgmental environment by providing group members with unconditional positive regard and encouraging group members to comply with group rules and guidelines. Therapist assisted group member with identifying and implementing healthier coping strategies.      Plan:  Continue [facility] IOP Phase I   Aftercare:  [facility] Phase II  Program Assignments:  Personal recovery plan, relapse prevention plan, attendance of recovery support group meetings, exploration of sponsorship, drug/alcohol screens.     Fabián Martinze LCSW  8/15/2024  10:33 EDT

## 2024-08-16 LAB — REF LAB TEST METHOD: NORMAL

## 2024-08-19 NOTE — PROGRESS NOTES
[facility] IOP GROUP     Date: 08/14/2024  Name: Pieter William    Time In: 1802   Time Out: Approximately 2052     Number of participants: 12    IOP GROUP NOTE     Data: 3 hour IOP group therapy session (Check-ins, Coping Skills, Relapse Prevention)     Check Ins: Therapist continued facilitation of rapport building strategies between group members. Therapist asked that each patient check in with home life and recovery efforts and identify triggers, cravings, and high risk situations that arise between group sessions. Therapist provided empathy and support during group session.     Session Content/Coping Skills: Dejah, attended for first part of session.  shared and discussed Just for Today reading. Discussion was facilitated regarding open mindedness. Clinician discussed with group the importance of internal work and discussed the availability of individual therapy to address trauma. Clinician educated group members on the everything AA tamara. Group members finished substance abuse crosswKuldat activity. Living in Balance- Session 1-Part 2 reviewed and discussed.     Response: Patient attended class in person. Patient participated in completion of check in form. Patient on check in form answered no to question of “currently or since your last group meeting, have you had any suicidal thoughts or plan or intent to hurt yourself”, and patient also answered no on check in form to question of “currently or since your last group meeting, have you had any homicidal thoughts or plan or intent to hurt others”.     Personal Assessment 0-10 Scale (0-none, 10-high)    Anxiety:  0   Depression:  0   Cravings: 0     Assessment:     ..  Lab on 08/14/2024   Component Date Value Ref Range Status    THC, Screen, Urine 08/14/2024 Negative  Negative Final    Phencyclidine (PCP), Urine 08/14/2024 Negative  Negative Final    Cocaine Screen, Urine 08/14/2024 Negative  Negative Final     Methamphetamine, Ur 08/14/2024 Negative  Negative Final    Opiate Screen 08/14/2024 Negative  Negative Final    Amphetamine Screen, Urine 08/14/2024 Negative  Negative Final    Benzodiazepine Screen, Urine 08/14/2024 Negative  Negative Final    Tricyclic Antidepressants Screen 08/14/2024 Negative  Negative Final    Methadone Screen, Urine 08/14/2024 Negative  Negative Final    Barbiturates Screen, Urine 08/14/2024 Negative  Negative Final    Oxycodone Screen, Urine 08/14/2024 Negative  Negative Final    Buprenorphine, Screen, Urine 08/14/2024 Negative  Negative Final   Lab on 08/08/2024   Component Date Value Ref Range Status    THC, Screen, Urine 08/08/2024 Negative  Negative Final    Phencyclidine (PCP), Urine 08/08/2024 Negative  Negative Final    Cocaine Screen, Urine 08/08/2024 Negative  Negative Final    Methamphetamine, Ur 08/08/2024 Negative  Negative Final    Opiate Screen 08/08/2024 Negative  Negative Final    Amphetamine Screen, Urine 08/08/2024 Negative  Negative Final    Benzodiazepine Screen, Urine 08/08/2024 Negative  Negative Final    Tricyclic Antidepressants Screen 08/08/2024 Negative  Negative Final    Methadone Screen, Urine 08/08/2024 Negative  Negative Final    Barbiturates Screen, Urine 08/08/2024 Negative  Negative Final    Oxycodone Screen, Urine 08/08/2024 Negative  Negative Final    Buprenorphine, Screen, Urine 08/08/2024 Negative  Negative Final       Mental Status Exam  Hygiene:  good  Dress: casual  Attitude: cooperative and agreeable   Motor Activity: appropriate  Eye Contact:  good  Speech: regular rate and rhythm   Mood:  calm and cooperative  Affect:  Appropriate  Thought Processes:  Linear  Thought Content:  Normal  Suicidal Thoughts:  denies  Homicidal Thoughts:  denies  Crisis Safety Plan: Safety plan has been discussed.   Hallucinations:  Unknown to clinician.   Reliability: fair  Insight: fair  Judgement: fair  Impulse Control: fair    Recovery/spiritual support group  attendance: No.      Progress toward goal: Not at goal    Prognosis: Fair with Ongoing Treatment     Self-reported number of days sober: Patient on check in form reported 82.     Patient will contact this office, call 911 or present to the nearest emergency room should suicidal or homicidal ideations occur.    Impression/Formulation:    ICD-10-CM ICD-9-CM   1. Moderate alcohol use disorder  F10.20 303.90   2. Moderate cannabis use disorder  F12.20 304.30   3. Moderate methamphetamine use disorder  F15.20 304.40       Clinical Maneuvering/Interventions: Therapist utilized a person-centered approach to build rapport with group member. Therapist implemented motivational interviewing techniques to assist client with exploring and resolving ambivalence associated with commitment to change behaviors related to substance use and addiction. Therapist applied cognitive behavioral strategies to facilitate identification of maladaptive patterns of thinking and behavior that contribute to client's risk for continued substance use and relapse. Therapist employed group interaction activities to build rapport among group members, promote sobriety, and emphasize relapse prevention. Therapist promoted safe nonjudgmental environment by providing group members with unconditional positive regard and encouraging group members to comply with group rules and guidelines. Therapist assisted group member with identifying and implementing healthier coping strategies.      Plan:  Continue [facility] IOP Phase I   Aftercare:  [facility] Phase II  Program Assignments:  Personal recovery plan, relapse prevention plan, attendance of recovery support group meetings, exploration of sponsorship, drug/alcohol screens.     Fabián Martinez LCSW  8/19/2024  16:31 EDT

## 2024-08-19 NOTE — PROGRESS NOTES
[facility] IOP GROUP     Date: 08/12/2024  Name: Pieter William    Time In: 1800   Time Out: 2100     Number of participants: 12    IOP GROUP NOTE     Data: 3 hour IOP group therapy session (Check-ins, Coping Skills, Relapse Prevention)     Check Ins: Therapist continued facilitation of rapport building strategies between group members. Therapist asked that each patient check in with home life and recovery efforts and identify triggers, cravings, and high risk situations that arise between group sessions. Therapist provided empathy and support during group session.     Session Content/Coping Skills: Dejah, attended for first part of session. Shitalin explained check in sheet to group members. Clinician also explained to group members the need for negative UDS. Individual treatment plans reviewed with group members. Group members signed the individual treatment plans. Check ins completed by group members. Living in Balance- Session 1- Part 1 reviewed and discussed. Group members began Substance Abuse Crossword activity.     Response: Patient attended class in person. Patient participated in completion of check in form. Patient on check in form answered no to question of “currently or since your last group meeting, have you had any suicidal thoughts or plan or intent to hurt yourself”, and patient also answered no on check in form to question of “currently or since your last group meeting, have you had any homicidal thoughts or plan or intent to hurt others”.     Personal Assessment 0-10 Scale (0-none, 10-high)    Anxiety:  0   Depression:  0   Cravings: 0     Assessment:     ..  Lab on 08/08/2024   Component Date Value Ref Range Status    THC, Screen, Urine 08/08/2024 Negative  Negative Final    Phencyclidine (PCP), Urine 08/08/2024 Negative  Negative Final    Cocaine Screen, Urine 08/08/2024 Negative  Negative Final    Methamphetamine, Ur 08/08/2024 Negative  Negative Final    Opiate Screen  08/08/2024 Negative  Negative Final    Amphetamine Screen, Urine 08/08/2024 Negative  Negative Final    Benzodiazepine Screen, Urine 08/08/2024 Negative  Negative Final    Tricyclic Antidepressants Screen 08/08/2024 Negative  Negative Final    Methadone Screen, Urine 08/08/2024 Negative  Negative Final    Barbiturates Screen, Urine 08/08/2024 Negative  Negative Final    Oxycodone Screen, Urine 08/08/2024 Negative  Negative Final    Buprenorphine, Screen, Urine 08/08/2024 Negative  Negative Final       Mental Status Exam  Hygiene:  good  Dress: casual  Attitude: cooperative and agreeable   Motor Activity: appropriate  Eye Contact:  good  Speech: regular rate and rhythm   Mood:  calm and cooperative  Affect:  Appropriate  Thought Processes:  Linear  Thought Content:  Normal  Suicidal Thoughts:  denies  Homicidal Thoughts:  denies  Crisis Safety Plan: Safety plan has been discussed.   Hallucinations:  Unknown to clinician.   Reliability: fair  Insight: fair  Judgement: fair  Impulse Control: fair    Recovery/spiritual support group attendance: No.      Progress toward goal: Not at goal    Prognosis: Fair with Ongoing Treatment     Self-reported number of days sober: Patient on check in form reported 80.     Patient will contact this office, call 911 or present to the nearest emergency room should suicidal or homicidal ideations occur.    Impression/Formulation:    ICD-10-CM ICD-9-CM   1. Moderate alcohol use disorder  F10.20 303.90   2. Moderate cannabis use disorder  F12.20 304.30   3. Moderate methamphetamine use disorder  F15.20 304.40       Clinical Maneuvering/Interventions: Therapist utilized a person-centered approach to build rapport with group member. Therapist implemented motivational interviewing techniques to assist client with exploring and resolving ambivalence associated with commitment to change behaviors related to substance use and addiction. Therapist applied cognitive behavioral strategies to  facilitate identification of maladaptive patterns of thinking and behavior that contribute to client's risk for continued substance use and relapse. Therapist employed group interaction activities to build rapport among group members, promote sobriety, and emphasize relapse prevention. Therapist promoted safe nonjudgmental environment by providing group members with unconditional positive regard and encouraging group members to comply with group rules and guidelines. Therapist assisted group member with identifying and implementing healthier coping strategies.      Plan:  Continue [facility] IOP Phase I   Aftercare:  [facility] Phase II  Program Assignments:  Personal recovery plan, relapse prevention plan, attendance of recovery support group meetings, exploration of sponsorship, drug/alcohol screens.     Fabián Martinez LCSW  8/19/2024  15:45 EDT

## 2024-08-20 LAB — REF LAB TEST METHOD: NORMAL

## 2024-08-21 ENCOUNTER — DOCUMENTATION (OUTPATIENT)
Dept: PSYCHIATRY | Facility: HOSPITAL | Age: 54
End: 2024-08-21
Payer: COMMERCIAL

## 2024-08-21 NOTE — PROGRESS NOTES
See CD-IOP Pre-Authorization. Patient was previously approved for CD-IOP until 8/14/2024. Additional time was requested. Fax was received showing patient is now approved for CD-IOP until 8/28/2024.    Clinician called patient at approximately 11:54 AM, [phone], no answer, left voicemail to call back. Patient will be able to start back into CD-IOP until 8/28/2024.    -Fabián Martinez LCSW.   8/21/2024.

## 2024-08-22 ENCOUNTER — OFFICE VISIT (OUTPATIENT)
Dept: PSYCHIATRY | Facility: HOSPITAL | Age: 54
End: 2024-08-22
Payer: COMMERCIAL

## 2024-08-22 DIAGNOSIS — F15.20 MODERATE METHAMPHETAMINE USE DISORDER: ICD-10-CM

## 2024-08-22 DIAGNOSIS — F12.20 MODERATE CANNABIS USE DISORDER: ICD-10-CM

## 2024-08-22 DIAGNOSIS — F10.20 MODERATE ALCOHOL USE DISORDER: Primary | ICD-10-CM

## 2024-08-22 PROCEDURE — H0015 ALCOHOL AND/OR DRUG SERVICES: HCPCS | Performed by: NURSE PRACTITIONER

## 2024-08-26 ENCOUNTER — OFFICE VISIT (OUTPATIENT)
Dept: PSYCHIATRY | Facility: HOSPITAL | Age: 54
End: 2024-08-26
Payer: COMMERCIAL

## 2024-08-26 DIAGNOSIS — F15.20 MODERATE METHAMPHETAMINE USE DISORDER: ICD-10-CM

## 2024-08-26 DIAGNOSIS — F12.20 MODERATE CANNABIS USE DISORDER: ICD-10-CM

## 2024-08-26 DIAGNOSIS — F10.20 MODERATE ALCOHOL USE DISORDER: Primary | ICD-10-CM

## 2024-08-26 PROCEDURE — H0015 ALCOHOL AND/OR DRUG SERVICES: HCPCS | Performed by: NURSE PRACTITIONER

## 2024-08-26 NOTE — PROGRESS NOTES
[facility] IOP GROUP     Date: 08/22/2024  Name: Pieter William    Time In: 1800   Time Out: 2058     Number of participants: 15    IOP GROUP NOTE     Data: 3 hour IOP group therapy session (Check-ins, Coping Skills, Relapse Prevention)     Check Ins: Therapist continued facilitation of rapport building strategies between group members. Therapist asked that each patient check in with home life and recovery efforts and identify triggers, cravings, and high risk situations that arise between group sessions. Therapist provided empathy and support during group session.     Session Content/Coping Skills: Dejah, attended session. TUCKER Burr also attended session. Check ins completed by group members. Poem “The Hole” by Lakisha Rodriguez read by . Clinician facilitated discussion with group regarding this poem. Living in Balance- Session 3- Part 1 reviewed and discussed. [employer] video “The Road to Recovery” began ([employer]).     Response: Patient attended class in person. Patient participated in completion of check in form. Patient on check in form answered no to question of “currently or since your last group meeting, have you had any suicidal thoughts or plan or intent to hurt yourself”, and patient also answered no on check in form to question of “currently or since your last group meeting, have you had any homicidal thoughts or plan or intent to hurt others”.     Personal Assessment 0-10 Scale (0-none, 10-high)    Anxiety:  0   Depression:  0   Cravings: 0     Assessment:     ..  Lab on 08/14/2024   Component Date Value Ref Range Status    THC, Screen, Urine 08/14/2024 Negative  Negative Final    Phencyclidine (PCP), Urine 08/14/2024 Negative  Negative Final    Cocaine Screen, Urine 08/14/2024 Negative  Negative Final    Methamphetamine, Ur 08/14/2024 Negative  Negative Final    Opiate Screen 08/14/2024 Negative  Negative Final    Amphetamine Screen, Urine  08/14/2024 Negative  Negative Final    Benzodiazepine Screen, Urine 08/14/2024 Negative  Negative Final    Tricyclic Antidepressants Screen 08/14/2024 Negative  Negative Final    Methadone Screen, Urine 08/14/2024 Negative  Negative Final    Barbiturates Screen, Urine 08/14/2024 Negative  Negative Final    Oxycodone Screen, Urine 08/14/2024 Negative  Negative Final    Buprenorphine, Screen, Urine 08/14/2024 Negative  Negative Final   Lab on 08/08/2024   Component Date Value Ref Range Status    THC, Screen, Urine 08/08/2024 Negative  Negative Final    Phencyclidine (PCP), Urine 08/08/2024 Negative  Negative Final    Cocaine Screen, Urine 08/08/2024 Negative  Negative Final    Methamphetamine, Ur 08/08/2024 Negative  Negative Final    Opiate Screen 08/08/2024 Negative  Negative Final    Amphetamine Screen, Urine 08/08/2024 Negative  Negative Final    Benzodiazepine Screen, Urine 08/08/2024 Negative  Negative Final    Tricyclic Antidepressants Screen 08/08/2024 Negative  Negative Final    Methadone Screen, Urine 08/08/2024 Negative  Negative Final    Barbiturates Screen, Urine 08/08/2024 Negative  Negative Final    Oxycodone Screen, Urine 08/08/2024 Negative  Negative Final    Buprenorphine, Screen, Urine 08/08/2024 Negative  Negative Final       Mental Status Exam  Hygiene:  good  Dress: casual  Attitude: cooperative and agreeable   Motor Activity: appropriate  Eye Contact:  good  Speech: regular rate and rhythm   Mood:  calm and cooperative  Affect:  Appropriate  Thought Processes:  Linear  Thought Content:  Normal  Suicidal Thoughts:  denies  Homicidal Thoughts:  denies  Crisis Safety Plan: Safety plan has been discussed.   Hallucinations:  Unknown to clinician.   Reliability: fair  Insight: fair  Judgement: fair  Impulse Control: fair    Recovery/spiritual support group attendance: No.      Progress toward goal: Not at goal    Prognosis: Fair with Ongoing Treatment     Self-reported number of days sober: Patient on  check in form reported 88.     Patient will contact this office, call 911 or present to the nearest emergency room should suicidal or homicidal ideations occur.    Impression/Formulation:    ICD-10-CM ICD-9-CM   1. Moderate alcohol use disorder  F10.20 303.90   2. Moderate cannabis use disorder  F12.20 304.30   3. Moderate methamphetamine use disorder  F15.20 304.40       Clinical Maneuvering/Interventions: Therapist utilized a person-centered approach to build rapport with group member. Therapist implemented motivational interviewing techniques to assist client with exploring and resolving ambivalence associated with commitment to change behaviors related to substance use and addiction. Therapist applied cognitive behavioral strategies to facilitate identification of maladaptive patterns of thinking and behavior that contribute to client's risk for continued substance use and relapse. Therapist employed group interaction activities to build rapport among group members, promote sobriety, and emphasize relapse prevention. Therapist promoted safe nonjudgmental environment by providing group members with unconditional positive regard and encouraging group members to comply with group rules and guidelines. Therapist assisted group member with identifying and implementing healthier coping strategies.      Plan:  Continue [facility] IOP Phase I   Aftercare:  [facility] Phase II  Program Assignments:  Personal recovery plan, relapse prevention plan, attendance of recovery support group meetings, exploration of sponsorship, drug/alcohol screens.     Fabián Martinez LCSW  8/26/2024  14:52 EDT

## 2024-08-28 ENCOUNTER — LAB (OUTPATIENT)
Dept: LAB | Facility: HOSPITAL | Age: 54
End: 2024-08-28
Payer: COMMERCIAL

## 2024-08-28 ENCOUNTER — OFFICE VISIT (OUTPATIENT)
Dept: PSYCHIATRY | Facility: HOSPITAL | Age: 54
End: 2024-08-28
Payer: COMMERCIAL

## 2024-08-28 DIAGNOSIS — F12.20 CANNABIS USE DISORDER, MODERATE, DEPENDENCE: ICD-10-CM

## 2024-08-28 DIAGNOSIS — F12.20 MODERATE CANNABIS USE DISORDER: ICD-10-CM

## 2024-08-28 DIAGNOSIS — F10.20 ALCOHOL USE DISORDER, MODERATE, DEPENDENCE: ICD-10-CM

## 2024-08-28 DIAGNOSIS — F10.20 MODERATE ALCOHOL USE DISORDER: Primary | ICD-10-CM

## 2024-08-28 DIAGNOSIS — F15.20 METHAMPHETAMINE USE DISORDER, MODERATE: ICD-10-CM

## 2024-08-28 DIAGNOSIS — F15.20 MODERATE METHAMPHETAMINE USE DISORDER: ICD-10-CM

## 2024-08-28 PROCEDURE — H0015 ALCOHOL AND/OR DRUG SERVICES: HCPCS | Performed by: NURSE PRACTITIONER

## 2024-08-28 PROCEDURE — 80306 DRUG TEST PRSMV INSTRMNT: CPT

## 2024-08-28 NOTE — PROGRESS NOTES
CD IOP GROUP     Date: 08/26/2024  Name: Pieter William    Time In: 1800   Time Out: 2054     Number of participants: 14    IOP GROUP NOTE     Data: 3 hour IOP group therapy session (Check-ins, Coping Skills, Relapse Prevention)     Check Ins: Therapist continued facilitation of rapport building strategies between group members. Therapist asked that each patient check in with home life and recovery efforts and identify triggers, cravings, and high risk situations that arise between group sessions. Therapist provided empathy and support during group session.     Session Content/Coping Skills: , Dejah, attended for first part of session. MIGUELINA Teran student, also attended session. Introductions completed. Check ins completed by group members.  shared Just for Today reading. Clinician and  facilitated discussion with group regarding honesty and inventory. Living in Balance-Session 3- Part 2 & 3 reviewed and discussed. Group members began completion of vision boards. Group members were asked to finish vision boards and bring to the next group session.     Response: Patient attended class in person. Patient participated in completion of check in form. Patient on check in form answered no to question of “currently or since your last group meeting, have you had any suicidal thoughts or plan or intent to hurt yourself”, and patient also answered no on check in form to question of “currently or since your last group meeting, have you had any homicidal thoughts or plan or intent to hurt others”.     Personal Assessment 0-10 Scale (0-none, 10-high)    Anxiety:  0   Depression:  0   Cravings: 0     Assessment:     ..  Lab on 08/14/2024   Component Date Value Ref Range Status    THC, Screen, Urine 08/14/2024 Negative  Negative Final    Phencyclidine (PCP), Urine 08/14/2024 Negative  Negative Final    Cocaine Screen, Urine 08/14/2024 Negative  Negative  Final    Methamphetamine, Ur 08/14/2024 Negative  Negative Final    Opiate Screen 08/14/2024 Negative  Negative Final    Amphetamine Screen, Urine 08/14/2024 Negative  Negative Final    Benzodiazepine Screen, Urine 08/14/2024 Negative  Negative Final    Tricyclic Antidepressants Screen 08/14/2024 Negative  Negative Final    Methadone Screen, Urine 08/14/2024 Negative  Negative Final    Barbiturates Screen, Urine 08/14/2024 Negative  Negative Final    Oxycodone Screen, Urine 08/14/2024 Negative  Negative Final    Buprenorphine, Screen, Urine 08/14/2024 Negative  Negative Final   Lab on 08/08/2024   Component Date Value Ref Range Status    THC, Screen, Urine 08/08/2024 Negative  Negative Final    Phencyclidine (PCP), Urine 08/08/2024 Negative  Negative Final    Cocaine Screen, Urine 08/08/2024 Negative  Negative Final    Methamphetamine, Ur 08/08/2024 Negative  Negative Final    Opiate Screen 08/08/2024 Negative  Negative Final    Amphetamine Screen, Urine 08/08/2024 Negative  Negative Final    Benzodiazepine Screen, Urine 08/08/2024 Negative  Negative Final    Tricyclic Antidepressants Screen 08/08/2024 Negative  Negative Final    Methadone Screen, Urine 08/08/2024 Negative  Negative Final    Barbiturates Screen, Urine 08/08/2024 Negative  Negative Final    Oxycodone Screen, Urine 08/08/2024 Negative  Negative Final    Buprenorphine, Screen, Urine 08/08/2024 Negative  Negative Final       Mental Status Exam  Hygiene:  good  Dress: casual  Attitude: cooperative and agreeable   Motor Activity: appropriate  Eye Contact:  good  Speech: regular rate and rhythm   Mood:  calm and cooperative  Affect:  Appropriate  Thought Processes:  Linear  Thought Content:  Normal  Suicidal Thoughts:  denies  Homicidal Thoughts:  denies  Crisis Safety Plan: Safety plan has been discussed.   Hallucinations:  Unknown to clinician.   Reliability: fair  Insight: fair  Judgement: fair  Impulse Control: fair    Recovery/spiritual support group  attendance: No.      Progress toward goal: Not at goal    Prognosis: Fair with Ongoing Treatment     Self-reported number of days sober: Patient on check in form reported 94.     Patient will contact this office, call 911 or present to the nearest emergency room should suicidal or homicidal ideations occur.    Impression/Formulation:    ICD-10-CM ICD-9-CM   1. Moderate alcohol use disorder  F10.20 303.90   2. Moderate cannabis use disorder  F12.20 304.30   3. Moderate methamphetamine use disorder  F15.20 304.40       Clinical Maneuvering/Interventions: Therapist utilized a person-centered approach to build rapport with group member. Therapist implemented motivational interviewing techniques to assist client with exploring and resolving ambivalence associated with commitment to change behaviors related to substance use and addiction. Therapist applied cognitive behavioral strategies to facilitate identification of maladaptive patterns of thinking and behavior that contribute to client's risk for continued substance use and relapse. Therapist employed group interaction activities to build rapport among group members, promote sobriety, and emphasize relapse prevention. Therapist promoted safe nonjudgmental environment by providing group members with unconditional positive regard and encouraging group members to comply with group rules and guidelines. Therapist assisted group member with identifying and implementing healthier coping strategies.      Plan:  Continue [facility] IOP Phase I   Aftercare:  [facility] Phase II  Program Assignments:  Personal recovery plan, relapse prevention plan, attendance of recovery support group meetings, exploration of sponsorship, drug/alcohol screens.     Fabián Martinez LCSW  8/28/2024  13:44 EDT

## 2024-08-29 ENCOUNTER — APPOINTMENT (OUTPATIENT)
Dept: PSYCHIATRY | Facility: HOSPITAL | Age: 54
End: 2024-08-29
Payer: COMMERCIAL

## 2024-09-03 ENCOUNTER — TELEPHONE (OUTPATIENT)
Dept: PSYCHIATRY | Facility: CLINIC | Age: 54
End: 2024-09-03
Payer: COMMERCIAL

## 2024-09-03 LAB — REF LAB TEST METHOD: NORMAL

## 2024-09-03 NOTE — PROGRESS NOTES
CD IOP GROUP     Date: 08/28/2024  Name: Pieter William    Time In: 1800   Time Out: 2056     Number of participants: 12    IOP GROUP NOTE     Data: 3 hour IOP group therapy session (Check-ins, Coping Skills, Relapse Prevention)     Check Ins: Therapist continued facilitation of rapport building strategies between group members. Therapist asked that each patient check in with home life and recovery efforts and identify triggers, cravings, and high risk situations that arise between group sessions. Therapist provided empathy and support during group session.     Session Content/Coping Skills: Two Truths and a lie icebreaker activity completed to enhance group cohesion. Check ins completed by group members. Group members discussed the vision board activity that they participated in the previous class. Value card sort activity completed by group members. Clinician facilitated discussion centered on values and the impact of active addiction on values. , Dejah, attended session. Dejah facilitated end of group discussion questions.     Response: Patient attended class in person. Patient participated in completion of check in form. Patient on check in form answered no to question of “currently or since your last group meeting, have you had any suicidal thoughts or plan or intent to hurt yourself”, and patient also answered no on check in form to question of “currently or since your last group meeting, have you had any homicidal thoughts or plan or intent to hurt others”.     Patient completed PHQ-9 and scored a 00.  Patient completed IRINEO-7 and scored a 00.     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     Personal Assessment 0-10 Scale (0-none, 10-high)    Anxiety:  0   Depression:  0   Cravings: 0     Assessment:     ..  Lab on 08/28/2024   Component Date Value Ref Range  Status    THC, Screen, Urine 08/28/2024 Negative  Negative Final    Phencyclidine (PCP), Urine 08/28/2024 Negative  Negative Final    Cocaine Screen, Urine 08/28/2024 Negative  Negative Final    Methamphetamine, Ur 08/28/2024 Negative  Negative Final    Opiate Screen 08/28/2024 Negative  Negative Final    Amphetamine Screen, Urine 08/28/2024 Negative  Negative Final    Benzodiazepine Screen, Urine 08/28/2024 Negative  Negative Final    Tricyclic Antidepressants Screen 08/28/2024 Negative  Negative Final    Methadone Screen, Urine 08/28/2024 Negative  Negative Final    Barbiturates Screen, Urine 08/28/2024 Negative  Negative Final    Oxycodone Screen, Urine 08/28/2024 Negative  Negative Final    Buprenorphine, Screen, Urine 08/28/2024 Negative  Negative Final   Lab on 08/14/2024   Component Date Value Ref Range Status    THC, Screen, Urine 08/14/2024 Negative  Negative Final    Phencyclidine (PCP), Urine 08/14/2024 Negative  Negative Final    Cocaine Screen, Urine 08/14/2024 Negative  Negative Final    Methamphetamine, Ur 08/14/2024 Negative  Negative Final    Opiate Screen 08/14/2024 Negative  Negative Final    Amphetamine Screen, Urine 08/14/2024 Negative  Negative Final    Benzodiazepine Screen, Urine 08/14/2024 Negative  Negative Final    Tricyclic Antidepressants Screen 08/14/2024 Negative  Negative Final    Methadone Screen, Urine 08/14/2024 Negative  Negative Final    Barbiturates Screen, Urine 08/14/2024 Negative  Negative Final    Oxycodone Screen, Urine 08/14/2024 Negative  Negative Final    Buprenorphine, Screen, Urine 08/14/2024 Negative  Negative Final   Lab on 08/08/2024   Component Date Value Ref Range Status    THC, Screen, Urine 08/08/2024 Negative  Negative Final    Phencyclidine (PCP), Urine 08/08/2024 Negative  Negative Final    Cocaine Screen, Urine 08/08/2024 Negative  Negative Final    Methamphetamine, Ur 08/08/2024 Negative  Negative Final    Opiate Screen 08/08/2024 Negative  Negative Final     Amphetamine Screen, Urine 08/08/2024 Negative  Negative Final    Benzodiazepine Screen, Urine 08/08/2024 Negative  Negative Final    Tricyclic Antidepressants Screen 08/08/2024 Negative  Negative Final    Methadone Screen, Urine 08/08/2024 Negative  Negative Final    Barbiturates Screen, Urine 08/08/2024 Negative  Negative Final    Oxycodone Screen, Urine 08/08/2024 Negative  Negative Final    Buprenorphine, Screen, Urine 08/08/2024 Negative  Negative Final       Mental Status Exam  Hygiene:  good  Dress: casual  Attitude: cooperative and agreeable   Motor Activity: appropriate  Eye Contact:  good  Speech: regular rate and rhythm   Mood:  calm and cooperative  Affect:  Appropriate  Thought Processes:  Linear  Thought Content:  Normal  Suicidal Thoughts:  denies  Homicidal Thoughts:  denies  Crisis Safety Plan: Safety plan has been discussed.   Hallucinations:  Unknown to clinician.   Reliability: fair  Insight: fair  Judgement: fair  Impulse Control: fair    Recovery/spiritual support group attendance: No.      Progress toward goal: Not at goal    Prognosis: Fair with Ongoing Treatment     Self-reported number of days sober: Patient on check in form reported 96.     Patient will contact this office, call 911 or present to the nearest emergency room should suicidal or homicidal ideations occur.    Impression/Formulation:    ICD-10-CM ICD-9-CM   1. Moderate alcohol use disorder  F10.20 303.90   2. Moderate cannabis use disorder  F12.20 304.30   3. Moderate methamphetamine use disorder  F15.20 304.40       Clinical Maneuvering/Interventions: Therapist utilized a person-centered approach to build rapport with group member. Therapist implemented motivational interviewing techniques to assist client with exploring and resolving ambivalence associated with commitment to change behaviors related to substance use and addiction. Therapist applied cognitive behavioral strategies to facilitate identification of maladaptive  patterns of thinking and behavior that contribute to client's risk for continued substance use and relapse. Therapist employed group interaction activities to build rapport among group members, promote sobriety, and emphasize relapse prevention. Therapist promoted safe nonjudgmental environment by providing group members with unconditional positive regard and encouraging group members to comply with group rules and guidelines. Therapist assisted group member with identifying and implementing healthier coping strategies.      Plan:  Continue [facility] IOP Phase I   Aftercare:  [facility] Phase II  Program Assignments:  Personal recovery plan, relapse prevention plan, attendance of recovery support group meetings, exploration of sponsorship, drug/alcohol screens.     Fabián Martinez LCSW  9/3/2024  11:57 EDT

## 2024-09-03 NOTE — TELEPHONE ENCOUNTER
Patient returned the phone call from earlier today.  Therapist and patient agreed that he will start phase 2 of CDIOP starting on September 5, 2024 during the 3:30 PM group

## 2024-09-03 NOTE — TELEPHONE ENCOUNTER
Therapist attempted to call the patient about class time and date for the CDIOP program phase 2.  Patient did not answer the phone so the therapist left a voicemail requesting the patient to call back.

## 2024-09-05 ENCOUNTER — OFFICE VISIT (OUTPATIENT)
Dept: PSYCHIATRY | Facility: CLINIC | Age: 54
End: 2024-09-05
Payer: COMMERCIAL

## 2024-09-05 ENCOUNTER — LAB (OUTPATIENT)
Dept: LAB | Facility: HOSPITAL | Age: 54
End: 2024-09-05
Payer: COMMERCIAL

## 2024-09-05 DIAGNOSIS — F15.20 MODERATE METHAMPHETAMINE USE DISORDER: ICD-10-CM

## 2024-09-05 DIAGNOSIS — F15.20 METHAMPHETAMINE USE DISORDER, MODERATE: ICD-10-CM

## 2024-09-05 DIAGNOSIS — F12.20 CANNABIS USE DISORDER, MODERATE, DEPENDENCE: ICD-10-CM

## 2024-09-05 DIAGNOSIS — F10.20 ALCOHOL USE DISORDER, MODERATE, DEPENDENCE: ICD-10-CM

## 2024-09-05 DIAGNOSIS — F12.20 MODERATE CANNABIS USE DISORDER: ICD-10-CM

## 2024-09-05 DIAGNOSIS — F10.20 MODERATE ALCOHOL USE DISORDER: Primary | ICD-10-CM

## 2024-09-05 LAB
AMPHET+METHAMPHET UR QL: POSITIVE
AMPHETAMINES UR QL: NEGATIVE
BARBITURATES UR QL SCN: NEGATIVE
BENZODIAZ UR QL SCN: NEGATIVE
BUPRENORPHINE SERPL-MCNC: NEGATIVE NG/ML
CANNABINOIDS SERPL QL: NEGATIVE
COCAINE UR QL: NEGATIVE
METHADONE UR QL SCN: NEGATIVE
OPIATES UR QL: NEGATIVE
OXYCODONE UR QL SCN: NEGATIVE
PCP UR QL SCN: NEGATIVE
TRICYCLICS UR QL SCN: NEGATIVE

## 2024-09-05 PROCEDURE — 90853 GROUP PSYCHOTHERAPY: CPT | Performed by: COUNSELOR

## 2024-09-05 PROCEDURE — 80306 DRUG TEST PRSMV INSTRMNT: CPT

## 2024-09-05 NOTE — PROGRESS NOTES
"[facility] PHASE II / Phase III GROUP NOTE    Name: Pieter William  Date: September 5, 2024    Time in:?3:30   Time out:?4:30   Participants: 7     Data: 1?hour group therapy session (Check ins, and negative core values activity)       Clinical Maneuvering/Interventions:?Therapist utilized a person-centered, Motivation interviewing, and CBT approaches to build rapport, exploring and resolving ambivalence associated with commitment to change behaviors related to substance use with and addiction, group member.?Therapist implemented motivational interviewing techniques to assist client with, facilitate identification of maladaptive patterns of thinking and behavior that contribute to client's risk for continued substance use and relapse.?Therapist employed group interaction activities to build rapport among group members, promote sobriety, and emphasize relapse prevention.?Therapist promoted a safe nonjudgmental environment by providing group members with unconditional positive regard and encouraging group members to comply with group rules and guidelines. Therapist assisted group members with identifying and implementing healthier coping strategies.       Response:?Patient attended class in person. Patient participated in completion of check in form. Patient on check in form answered no to question \"currently or since last group meeting,?have you had any suicidal thoughts or plan or intent to hurt yourself”, and patient also answered no to question of \"currently or since your last group meeting, have you had any homicidal thoughts or plan or intent to hurt others\".  Patient identified some resources that he utilizes to help him with reframing negative cognitions.    On a 1:10 Scale (0-none, 10-high):    Anxiety: 0  Depression: 0  Cravings: 0    Assessment     There are no diagnoses linked to this encounter.         Progress toward goal: At goal    Functional Status: Moderate impairment     Prognosis: Good with Ongoing " Treatment     Mental Status Exam:   Hygiene:   good  Cooperation:  Cooperative  Eye Contact:  Good  Psychomotor Behavior:  Appropriate  Affect:  Appropriate  Mood: normal  Speech:  Normal  Thought Process:  Linear  Thought Content:  Mood congruent  Suicidal:  None  Homicidal:  None  Hallucinations:  None  Delusion:  None  Memory:  Intact  Orientation:  Person, Place, Time, and Situation  Reliability:  fair  Insight:  fair  Judgement:  fair  Impulse Control:  fair  Physical/Medical Issues:  No      Assessment:  Engaged in activity/Process and self-disclosed: Yes  Affect:Appropriate   Applies topic to self: Yes  Able to give and receive feedback: Yes    Urinalysis: Positive amphetamine on pealing result dated 9/5/2024  Labs:   Last Urine Toxicity  More data exists         Latest Ref Rng & Units 8/28/2024 8/14/2024   LAST URINE TOXICITY RESULTS   Amphetamine, Urine Qual Negative Negative  Negative    Barbiturates Screen, Urine Negative Negative  Negative    Benzodiazepine Screen, Urine Negative Negative  Negative    Buprenorphine, Screen, Urine Negative Negative  Negative    Cocaine Screen, Urine Negative Negative  Negative    Methadone Screen , Urine Negative Negative  Negative    Methamphetamine, Ur Negative Negative  Negative       Details                    Self-Reported days since last use: Patient check-in sheet reports 104 days    12-Step attendance: Patient check-in sheet reports zero 12-step meetings    Plan:   Patient will continue in weekly group psychotherapy sessions and individual outpatient psychotherapy sessions every 3-4 weeks and will continue in self-help meetings and seek additional treatment if necessary following completion.    Patient will continue in IOP Phase two and three group.      Safety plan has previously been discussed with patient.     TUCKER Rider  [unfilled]  17:54 EDT

## 2024-09-09 ENCOUNTER — OFFICE VISIT (OUTPATIENT)
Dept: PSYCHIATRY | Facility: CLINIC | Age: 54
End: 2024-09-09
Payer: COMMERCIAL

## 2024-09-09 DIAGNOSIS — F10.20 MODERATE ALCOHOL USE DISORDER: Primary | ICD-10-CM

## 2024-09-09 DIAGNOSIS — F12.20 MODERATE CANNABIS USE DISORDER: ICD-10-CM

## 2024-09-09 DIAGNOSIS — F15.20 MODERATE METHAMPHETAMINE USE DISORDER: ICD-10-CM

## 2024-09-09 PROCEDURE — 90853 GROUP PSYCHOTHERAPY: CPT | Performed by: COUNSELOR

## 2024-09-10 NOTE — PROGRESS NOTES
"[facility] PHASE II / Phase III GROUP NOTE    Name: Pieter William  Date: September 9, 2024    Time in:?3:30   Time out:?4:30   Participants: 6    Data: 1?hour group therapy session (Check ins, negative core values activity, and jeopardy)       Clinical Maneuvering/Interventions:?Therapist utilized a person-centered, Motivation interviewing, and CBT approaches to build rapport, exploring and resolving ambivalence associated with commitment to change behaviors related to substance use with and addiction, group member.?Therapist implemented motivational interviewing techniques to assist client with, facilitate identification of maladaptive patterns of thinking and behavior that contribute to client's risk for continued substance use and relapse.?Therapist employed group interaction activities to build rapport among group members, promote sobriety, and emphasize relapse prevention.?Therapist promoted a safe nonjudgmental environment by providing group members with unconditional positive regard and encouraging group members to comply with group rules and guidelines. Therapist assisted group members with identifying and implementing healthier coping strategies.       Response:?Patient attended class in person. Patient participated in completion of check in form. Patient on check in form answered no to question \"currently or since last group meeting,?have you had any suicidal thoughts or plan or intent to hurt yourself”, and patient also answered no to question of \"currently or since your last group meeting, have you had any homicidal thoughts or plan or intent to hurt others\".  Patient participated in group discussion by identifying some negative core beliefs and values.  Patient identified how he is reframing those core beliefs and values.  Patient was able to show some teamwork skills and communication skills and display knowledge to the Jeopardy activity.    On a 1:10 Scale (0-none, 10-high):    Anxiety: " 0  Depression: 0  Cravings: 0    Assessment     Diagnoses and all orders for this visit:    1. Moderate alcohol use disorder (Primary)    2. Moderate cannabis use disorder    3. Moderate methamphetamine use disorder             Progress toward goal: At goal    Functional Status: Moderate impairment     Prognosis: Good with Ongoing Treatment     Mental Status Exam:   Hygiene:   good  Cooperation:  Cooperative  Eye Contact:  Good  Psychomotor Behavior:  Appropriate  Affect:  Appropriate  Mood: normal  Speech:  Normal  Thought Process:  Linear  Thought Content:  Mood congruent  Suicidal:  None  Homicidal:  None  Hallucinations:  None  Delusion:  None  Memory:  Intact  Orientation:  Person, Place, Time, and Situation  Reliability:  fair  Insight:  fair  Judgement:  fair  Impulse Control:  fair  Physical/Medical Issues:  No      Assessment:  Engaged in activity/Process and self-disclosed: Yes  Affect:Appropriate   Applies topic to self: Yes  Able to give and receive feedback: Yes    Urinalysis: Negative amphetamine on preliminary result dated 9/5/2024  Labs:   Last Urine Toxicity  More data exists         Latest Ref Rng & Units 9/5/2024 8/28/2024   LAST URINE TOXICITY RESULTS   Amphetamine, Urine Qual Negative Positive  Negative    Barbiturates Screen, Urine Negative Negative  Negative    Benzodiazepine Screen, Urine Negative Negative  Negative    Buprenorphine, Screen, Urine Negative Negative  Negative    Cocaine Screen, Urine Negative Negative  Negative    Methadone Screen , Urine Negative Negative  Negative    Methamphetamine, Ur Negative Negative  Negative       Details                    Self-Reported days since last use: Patient check-in sheet reports 4 days    12-Step attendance: Patient check-in sheet reports zero 12-step meetings    Plan:   Patient will continue in weekly group psychotherapy sessions and individual outpatient psychotherapy sessions every 3-4 weeks and will continue in self-help meetings and seek  additional treatment if necessary following completion.    Patient will continue in IOP Phase two and three group.      Safety plan has previously been discussed with patient.     TUCKER Rider  [unfilled]  06:25 EDT

## 2024-09-11 ENCOUNTER — DOCUMENTATION (OUTPATIENT)
Dept: PSYCHIATRY | Facility: HOSPITAL | Age: 54
End: 2024-09-11
Payer: COMMERCIAL

## 2024-09-11 NOTE — PROGRESS NOTES
[facility]  [address], [address]  [phone]    CHEMICAL DEPENDENCY   INTENSIVE OUTPATIENT PROGRAM    PHASE I  DISCHARGE SUMMARY    ATTENDING: SANCHEZ Riddle.  THERAPIST: Fabián Martinez LCSW.    DATE OF ADMISSION: 8/5/2024 (Date of first CD-IOP class completed).   DATE OF DISCHARGE: 8/29/2024.     REASON FOR ADMISSION: Pieter William was referred by Court and admitted to IOP Phase I for Moderate alcohol use disorder; Moderate cannabis use disorder; and Moderate methamphetamine use disorder.     SUMMARY OF CARE, TREATMENT, and SERVICES PROVIDED: Pieter William was assessed and determined to meet [facility] level of care on 7/18/2024. Pt began IOP on 8/5/2024 and attended 8 group therapy sessions. Pt had 3 excused absences due to insurance coverage extension requests. Patient displayed negative results on UDS completed while in CD-IOP Phase One (UDS completed on 8/8/2024, 8/14/2024, and 8/28/2024).     AFTERCARE PLAN:  Pieter William completed [facility] Dependency IOP Phase I on 8/8/2024 and due to UR, negative results on UDS, and patient recovery shown while in phase one, patient will be allowed to advance to -IOP Phase II to complete remainder of classes in program.     Current Outpatient Medications:     cyclobenzaprine (FLEXERIL) 10 MG tablet, Take 1 tablet by mouth 3 (Three) Times a Day As Needed for Muscle Spasms., Disp: 21 tablet, Rfl: 0    PROGNOSIS: Good with continued care.     -Fabián Martinez LCSW.   9/11/2024.

## 2024-09-12 ENCOUNTER — OFFICE VISIT (OUTPATIENT)
Dept: PSYCHIATRY | Facility: CLINIC | Age: 54
End: 2024-09-12
Payer: COMMERCIAL

## 2024-09-12 ENCOUNTER — LAB (OUTPATIENT)
Dept: LAB | Facility: HOSPITAL | Age: 54
End: 2024-09-12
Payer: COMMERCIAL

## 2024-09-12 DIAGNOSIS — F15.20 METHAMPHETAMINE USE DISORDER, MODERATE: ICD-10-CM

## 2024-09-12 DIAGNOSIS — F10.20 ALCOHOL USE DISORDER, MODERATE, DEPENDENCE: ICD-10-CM

## 2024-09-12 DIAGNOSIS — F10.20 MODERATE ALCOHOL USE DISORDER: Primary | ICD-10-CM

## 2024-09-12 DIAGNOSIS — F12.20 MODERATE CANNABIS USE DISORDER: ICD-10-CM

## 2024-09-12 DIAGNOSIS — F12.20 CANNABIS USE DISORDER, MODERATE, DEPENDENCE: ICD-10-CM

## 2024-09-12 DIAGNOSIS — F15.20 MODERATE METHAMPHETAMINE USE DISORDER: ICD-10-CM

## 2024-09-12 PROCEDURE — 80306 DRUG TEST PRSMV INSTRMNT: CPT

## 2024-09-13 LAB — REF LAB TEST METHOD: NORMAL

## 2024-09-16 ENCOUNTER — LAB (OUTPATIENT)
Dept: LAB | Facility: HOSPITAL | Age: 54
End: 2024-09-16
Payer: COMMERCIAL

## 2024-09-16 ENCOUNTER — OFFICE VISIT (OUTPATIENT)
Dept: PSYCHIATRY | Facility: CLINIC | Age: 54
End: 2024-09-16
Payer: COMMERCIAL

## 2024-09-16 DIAGNOSIS — F10.20 ALCOHOL USE DISORDER, MODERATE, DEPENDENCE: ICD-10-CM

## 2024-09-16 DIAGNOSIS — F12.20 CANNABIS USE DISORDER, MODERATE, DEPENDENCE: ICD-10-CM

## 2024-09-16 DIAGNOSIS — F12.20 MODERATE CANNABIS USE DISORDER: ICD-10-CM

## 2024-09-16 DIAGNOSIS — F15.20 METHAMPHETAMINE USE DISORDER, MODERATE: ICD-10-CM

## 2024-09-16 DIAGNOSIS — F15.20 MODERATE METHAMPHETAMINE USE DISORDER: ICD-10-CM

## 2024-09-16 DIAGNOSIS — F10.20 MODERATE ALCOHOL USE DISORDER: Primary | ICD-10-CM

## 2024-09-16 PROCEDURE — 90853 GROUP PSYCHOTHERAPY: CPT | Performed by: COUNSELOR

## 2024-09-16 PROCEDURE — 80306 DRUG TEST PRSMV INSTRMNT: CPT

## 2024-09-22 LAB — REF LAB TEST METHOD: NORMAL

## 2024-09-23 ENCOUNTER — OFFICE VISIT (OUTPATIENT)
Dept: PSYCHIATRY | Facility: CLINIC | Age: 54
End: 2024-09-23
Payer: COMMERCIAL

## 2024-09-23 DIAGNOSIS — F10.20 MODERATE ALCOHOL USE DISORDER: Primary | ICD-10-CM

## 2024-09-23 DIAGNOSIS — F15.20 MODERATE METHAMPHETAMINE USE DISORDER: ICD-10-CM

## 2024-09-23 DIAGNOSIS — F12.20 MODERATE CANNABIS USE DISORDER: ICD-10-CM

## 2024-09-23 PROCEDURE — 90853 GROUP PSYCHOTHERAPY: CPT | Performed by: COUNSELOR

## 2024-09-26 ENCOUNTER — OFFICE VISIT (OUTPATIENT)
Dept: PSYCHIATRY | Facility: CLINIC | Age: 54
End: 2024-09-26
Payer: COMMERCIAL

## 2024-09-26 ENCOUNTER — LAB (OUTPATIENT)
Dept: LAB | Facility: HOSPITAL | Age: 54
End: 2024-09-26
Payer: COMMERCIAL

## 2024-09-26 DIAGNOSIS — F10.20 ALCOHOL USE DISORDER, MODERATE, DEPENDENCE: ICD-10-CM

## 2024-09-26 DIAGNOSIS — F12.20 MODERATE CANNABIS USE DISORDER: ICD-10-CM

## 2024-09-26 DIAGNOSIS — F15.20 METHAMPHETAMINE USE DISORDER, MODERATE: ICD-10-CM

## 2024-09-26 DIAGNOSIS — F12.20 CANNABIS USE DISORDER, MODERATE, DEPENDENCE: ICD-10-CM

## 2024-09-26 DIAGNOSIS — F15.20 MODERATE METHAMPHETAMINE USE DISORDER: ICD-10-CM

## 2024-09-26 DIAGNOSIS — F10.20 MODERATE ALCOHOL USE DISORDER: Primary | ICD-10-CM

## 2024-09-26 PROCEDURE — 80306 DRUG TEST PRSMV INSTRMNT: CPT

## 2024-09-26 PROCEDURE — 90853 GROUP PSYCHOTHERAPY: CPT | Performed by: COUNSELOR

## 2024-09-30 ENCOUNTER — OFFICE VISIT (OUTPATIENT)
Dept: PSYCHIATRY | Facility: CLINIC | Age: 54
End: 2024-09-30
Payer: COMMERCIAL

## 2024-09-30 DIAGNOSIS — F12.20 MODERATE CANNABIS USE DISORDER: ICD-10-CM

## 2024-09-30 DIAGNOSIS — F15.20 MODERATE METHAMPHETAMINE USE DISORDER: ICD-10-CM

## 2024-09-30 DIAGNOSIS — F10.20 MODERATE ALCOHOL USE DISORDER: Primary | ICD-10-CM

## 2024-09-30 PROCEDURE — 90853 GROUP PSYCHOTHERAPY: CPT | Performed by: COUNSELOR

## 2024-09-30 NOTE — PROGRESS NOTES
"[facility] PHASE II / Phase III GROUP NOTE    Name: Pieter William  Date: September 30, 2024    Time in:?3:30   Time out:?4:30   Participants: 8     Data: 1?hour group therapy session (Check ins, team building exercise, and coping skills activity)       Clinical Maneuvering/Interventions:?Therapist utilized a person-centered, Motivation interviewing, and CBT approaches to build rapport, exploring and resolving ambivalence associated with commitment to change behaviors related to substance use with and addiction, group member.?Therapist implemented motivational interviewing techniques to assist client with, facilitate identification of maladaptive patterns of thinking and behavior that contribute to client's risk for continued substance use and relapse.?Therapist employed group interaction activities to build rapport among group members, promote sobriety, and emphasize relapse prevention.?Therapist promoted a safe nonjudgmental environment by providing group members with unconditional positive regard and encouraging group members to comply with group rules and guidelines. Therapist assisted group members with identifying and implementing healthier coping strategies.       Response:?Patient attended class in person. Patient participated in completion of check in form. Patient on check in form answered no to question \"currently or since last group meeting,?have you had any suicidal thoughts or plan or intent to hurt yourself”, and patient also answered no to question of \"currently or since your last group meeting, have you had any homicidal thoughts or plan or intent to hurt others\".  Patient participated in the teen building activity to assist him in building communication skills, practice vulnerability, and confidence.  Patient identified some coping skills that he is utilizing to help him maintain his sobriety.    On a 1:10 Scale (0-none, 10-high):    Anxiety: 0  Depression: 0  Cravings: 0    Assessment "     Diagnoses and all orders for this visit:    1. Moderate alcohol use disorder (Primary)    2. Moderate cannabis use disorder    3. Moderate methamphetamine use disorder             Progress toward goal: At goal    Functional Status: Moderate impairment     Prognosis: Good with Ongoing Treatment     Mental Status Exam:   Hygiene:   good  Cooperation:  Cooperative  Eye Contact:  Good  Psychomotor Behavior:  Appropriate  Affect:  Appropriate  Mood: normal  Speech:  Normal  Thought Process:  Linear  Thought Content:  Mood congruent  Suicidal:  None  Homicidal:  None  Hallucinations:  None  Delusion:  None  Memory:  Intact  Orientation:  Person, Place, Time, and Situation  Reliability:  fair  Insight:  fair  Judgement:  fair  Impulse Control:  fair  Physical/Medical Issues:  No      Assessment:  Engaged in activity/Process and self-disclosed: Yes  Affect:Appropriate   Applies topic to self: Yes  Able to give and receive feedback: Yes    Urinalysis: Negative  on confirmation result dated 9/26/2024  Labs:   Last Urine Toxicity  More data exists         Latest Ref Rng & Units 9/26/2024 9/16/2024   LAST URINE TOXICITY RESULTS   Amphetamine, Urine Qual Negative Negative  Negative    Barbiturates Screen, Urine Negative Negative  Negative    Benzodiazepine Screen, Urine Negative Negative  Negative    Buprenorphine, Screen, Urine Negative Negative  Negative    Cocaine Screen, Urine Negative Negative  Negative    Methadone Screen , Urine Negative Negative  Negative    Methamphetamine, Ur Negative Negative  Negative       Details                    Self-Reported days since last use: Patient check-in sheet reports 25 days    12-Step attendance: Patient check-in sheet reports zero 12-step meetings    Plan:   Patient will continue in weekly group psychotherapy sessions and individual outpatient psychotherapy sessions every 3-4 weeks and will continue in self-help meetings and seek additional treatment if necessary following  completion.    Patient will continue in IOP Phase two and three group.      Safety plan has previously been discussed with patient.     TUCKER Rider  [unfilled]  16:15 EDT

## 2024-10-01 LAB
REF LAB TEST METHOD: NORMAL
REF LAB TEST METHOD: NORMAL

## 2024-10-03 ENCOUNTER — OFFICE VISIT (OUTPATIENT)
Dept: PSYCHIATRY | Facility: CLINIC | Age: 54
End: 2024-10-03
Payer: COMMERCIAL

## 2024-10-03 DIAGNOSIS — F10.20 MODERATE ALCOHOL USE DISORDER: Primary | ICD-10-CM

## 2024-10-03 DIAGNOSIS — F15.20 MODERATE METHAMPHETAMINE USE DISORDER: ICD-10-CM

## 2024-10-03 DIAGNOSIS — F12.20 MODERATE CANNABIS USE DISORDER: ICD-10-CM

## 2024-10-03 PROCEDURE — 90853 GROUP PSYCHOTHERAPY: CPT | Performed by: COUNSELOR

## 2024-10-03 NOTE — PROGRESS NOTES
"[facility] PHASE II / Phase III GROUP NOTE    Name: Pieter William  Date: October 3, 2024    Time in:?3:30   Time out:?4:30   Participants: 6     Data: 1?hour group therapy session (Check ins, 12 step information and team building activity)         Clinical Maneuvering/Interventions:?Therapist utilized a person-centered, Motivation interviewing, and CBT approaches to build rapport, exploring and resolving ambivalence associated with commitment to change behaviors related to substance use with and addiction, group member.?Therapist implemented motivational interviewing techniques to assist client with, facilitate identification of maladaptive patterns of thinking and behavior that contribute to client's risk for continued substance use and relapse.?Therapist employed group interaction activities to build rapport among group members, promote sobriety, and emphasize relapse prevention.?Therapist promoted a safe nonjudgmental environment by providing group members with unconditional positive regard and encouraging group members to comply with group rules and guidelines. Therapist assisted group members with identifying and implementing healthier coping strategies.       Response:?Patient attended class in person. Patient participated in completion of check in form. Patient on check in form answered no to question \"currently or since last group meeting,?have you had any suicidal thoughts or plan or intent to hurt yourself”, and patient also answered no to question of \"currently or since your last group meeting, have you had any homicidal thoughts or plan or intent to hurt others\".  Patient openly shared his his thoughts on 12-step meetings and identified some worries that he has.    On a 1:10 Scale (0-none, 10-high):    Anxiety: 0  Depression: 0  Cravings: 0    Assessment     Diagnoses and all orders for this visit:    1. Moderate alcohol use disorder (Primary)    2. Moderate cannabis use disorder    3. Moderate " methamphetamine use disorder             Progress toward goal: At goal    Functional Status: Moderate impairment     Prognosis: Good with Ongoing Treatment     Mental Status Exam:   Hygiene:   good  Cooperation:  Cooperative  Eye Contact:  Good  Psychomotor Behavior:  Appropriate  Affect:  Appropriate  Mood: normal  Speech:  Normal  Thought Process:  Linear  Thought Content:  Mood congruent  Suicidal:  None  Homicidal:  None  Hallucinations:  None  Delusion:  None  Memory:  Intact  Orientation:  Person, Place, Time, and Situation  Reliability:  fair  Insight:  fair  Judgement:  fair  Impulse Control:  fair  Physical/Medical Issues:  No      Assessment:  Engaged in activity/Process and self-disclosed: Yes  Affect:Appropriate   Applies topic to self: Yes  Able to give and receive feedback: Yes    Urinalysis: Negative on confirmation result dated 9/26/2024.  Patient has yet to screen on the week of 9/30/2024 through 10/3/2024 which will result as a felt screen of that week and less he screams when asked by the peers support for 10/04/24  Labs:   Last Urine Toxicity  More data exists         Latest Ref Rng & Units 9/26/2024 9/16/2024   LAST URINE TOXICITY RESULTS   Amphetamine, Urine Qual Negative Negative  Negative    Barbiturates Screen, Urine Negative Negative  Negative    Benzodiazepine Screen, Urine Negative Negative  Negative    Buprenorphine, Screen, Urine Negative Negative  Negative    Cocaine Screen, Urine Negative Negative  Negative    Methadone Screen , Urine Negative Negative  Negative    Methamphetamine, Ur Negative Negative  Negative       Details                    Self-Reported days since last use: Patient check-in sheet reports 28 days    12-Step attendance: Patient check-in sheet reports zero 12-step meetings    Plan:   Patient will continue in weekly group psychotherapy sessions and individual outpatient psychotherapy sessions every 3-4 weeks and will continue in self-help meetings and seek additional  treatment if necessary following completion.    Patient will continue in IOP Phase two and three group.      Safety plan has previously been discussed with patient.     TUCKER Rider  [unfilled]  16:00 EDT

## 2024-10-04 ENCOUNTER — LAB (OUTPATIENT)
Dept: LAB | Facility: HOSPITAL | Age: 54
End: 2024-10-04
Payer: COMMERCIAL

## 2024-10-04 DIAGNOSIS — F15.20 METHAMPHETAMINE USE DISORDER, MODERATE: ICD-10-CM

## 2024-10-04 DIAGNOSIS — F12.20 CANNABIS USE DISORDER, MODERATE, DEPENDENCE: ICD-10-CM

## 2024-10-04 DIAGNOSIS — F10.20 ALCOHOL USE DISORDER, MODERATE, DEPENDENCE: ICD-10-CM

## 2024-10-04 PROCEDURE — 80306 DRUG TEST PRSMV INSTRMNT: CPT

## 2024-10-07 ENCOUNTER — LAB (OUTPATIENT)
Dept: LAB | Facility: HOSPITAL | Age: 54
End: 2024-10-07
Payer: COMMERCIAL

## 2024-10-07 ENCOUNTER — OFFICE VISIT (OUTPATIENT)
Dept: PSYCHIATRY | Facility: CLINIC | Age: 54
End: 2024-10-07
Payer: COMMERCIAL

## 2024-10-07 DIAGNOSIS — F15.20 METHAMPHETAMINE USE DISORDER, MODERATE: ICD-10-CM

## 2024-10-07 DIAGNOSIS — F10.20 ALCOHOL USE DISORDER, MODERATE, DEPENDENCE: ICD-10-CM

## 2024-10-07 DIAGNOSIS — F12.20 MODERATE CANNABIS USE DISORDER: ICD-10-CM

## 2024-10-07 DIAGNOSIS — F15.20 MODERATE METHAMPHETAMINE USE DISORDER: ICD-10-CM

## 2024-10-07 DIAGNOSIS — F12.20 CANNABIS USE DISORDER, MODERATE, DEPENDENCE: ICD-10-CM

## 2024-10-07 DIAGNOSIS — F10.20 MODERATE ALCOHOL USE DISORDER: Primary | ICD-10-CM

## 2024-10-07 PROCEDURE — 80306 DRUG TEST PRSMV INSTRMNT: CPT

## 2024-10-07 PROCEDURE — 90853 GROUP PSYCHOTHERAPY: CPT | Performed by: COUNSELOR

## 2024-10-07 NOTE — PROGRESS NOTES
"[facility] PHASE II / Phase III GROUP NOTE    Name: Pieter William  Date: October 7, 2024    Time in:?3:30   Time out:?4:30   Participants: 9     Data: 1?hour group therapy session (Check ins, 12 step information and team building activity)         Clinical Maneuvering/Interventions:?Therapist utilized a person-centered, Motivation interviewing, and CBT approaches to build rapport, exploring and resolving ambivalence associated with commitment to change behaviors related to substance use with and addiction, group member.?Therapist implemented motivational interviewing techniques to assist client with, facilitate identification of maladaptive patterns of thinking and behavior that contribute to client's risk for continued substance use and relapse.?Therapist employed group interaction activities to build rapport among group members, promote sobriety, and emphasize relapse prevention.?Therapist promoted a safe nonjudgmental environment by providing group members with unconditional positive regard and encouraging group members to comply with group rules and guidelines. Therapist assisted group members with identifying and implementing healthier coping strategies.       Response:?Patient attended class in person. Patient participated in completion of check in form. Patient on check in form answered no to question \"currently or since last group meeting,?have you had any suicidal thoughts or plan or intent to hurt yourself”, and patient also answered no to question of \"currently or since your last group meeting, have you had any homicidal thoughts or plan or intent to hurt others\".  Patient participated in group discussion by identifying his responses to the group on the 12-step information questions and actively participating in the teen building activity.    On a 1:10 Scale (0-none, 10-high):    Anxiety: 0  Depression: 0  Cravings: 0    Assessment     Diagnoses and all orders for this visit:    1. Moderate alcohol use " disorder (Primary)    2. Moderate cannabis use disorder    3. Moderate methamphetamine use disorder             Progress toward goal: At goal    Functional Status: Moderate impairment     Prognosis: Good with Ongoing Treatment     Mental Status Exam:   Hygiene:   good  Cooperation:  Cooperative  Eye Contact:  Good  Psychomotor Behavior:  Appropriate  Affect:  Appropriate  Mood: normal  Speech:  Normal  Thought Process:  Linear  Thought Content:  Mood congruent  Suicidal:  None  Homicidal:  None  Hallucinations:  None  Delusion:  None  Memory:  Intact  Orientation:  Person, Place, Time, and Situation  Reliability:  fair  Insight:  fair  Judgement:  fair  Impulse Control:  fair  Physical/Medical Issues:  No      Assessment:  Engaged in activity/Process and self-disclosed: Yes  Affect:Appropriate   Applies topic to self: Yes  Able to give and receive feedback: Yes    Urinalysis: Negative on preliminary result dated 10/7/2024  Labs:   Last Urine Toxicity  More data exists         Latest Ref Rng & Units 10/4/2024 9/26/2024   LAST URINE TOXICITY RESULTS   Amphetamine, Urine Qual Negative Negative  Negative    Barbiturates Screen, Urine Negative Negative  Negative    Benzodiazepine Screen, Urine Negative Negative  Negative    Buprenorphine, Screen, Urine Negative Negative  Negative    Cocaine Screen, Urine Negative Negative  Negative    Methadone Screen , Urine Negative Negative  Negative    Methamphetamine, Ur Negative Negative  Negative       Details                    Self-Reported days since last use: Patient check-in sheet reports 32 days    12-Step attendance: Patient check-in sheet reports zero 12-step meetings    Plan:   Patient will continue in weekly group psychotherapy sessions and individual outpatient psychotherapy sessions every 3-4 weeks and will continue in self-help meetings and seek additional treatment if necessary following completion.    Patient will continue in IOP Phase two and three group.       Safety plan has previously been discussed with patient.     TUCKER Rider  [unfilled]  15:54 EDT

## 2024-10-10 ENCOUNTER — OFFICE VISIT (OUTPATIENT)
Dept: PSYCHIATRY | Facility: CLINIC | Age: 54
End: 2024-10-10
Payer: COMMERCIAL

## 2024-10-10 DIAGNOSIS — F15.20 MODERATE METHAMPHETAMINE USE DISORDER: ICD-10-CM

## 2024-10-10 DIAGNOSIS — F10.20 MODERATE ALCOHOL USE DISORDER: Primary | ICD-10-CM

## 2024-10-10 DIAGNOSIS — F12.20 MODERATE CANNABIS USE DISORDER: ICD-10-CM

## 2024-10-10 PROCEDURE — 90853 GROUP PSYCHOTHERAPY: CPT | Performed by: COUNSELOR

## 2024-10-10 NOTE — PROGRESS NOTES
"[facility] PHASE II / Phase III GROUP NOTE    Name: Pieter William  Date: October 10, 2024    Time in:?3:30   Time out:?4:30   Participants: 6     Data: 1?hour group therapy session (Check ins, 12 step information and team building activity)         Clinical Maneuvering/Interventions:?Therapist utilized a person-centered, Motivation interviewing, and CBT approaches to build rapport, exploring and resolving ambivalence associated with commitment to change behaviors related to substance use with and addiction, group member.?Therapist implemented motivational interviewing techniques to assist client with, facilitate identification of maladaptive patterns of thinking and behavior that contribute to client's risk for continued substance use and relapse.?Therapist employed group interaction activities to build rapport among group members, promote sobriety, and emphasize relapse prevention.?Therapist promoted a safe nonjudgmental environment by providing group members with unconditional positive regard and encouraging group members to comply with group rules and guidelines. Therapist assisted group members with identifying and implementing healthier coping strategies.       Response:?Patient attended class in person. Patient participated in completion of check in form. Patient on check in form answered no to question \"currently or since last group meeting,?have you had any suicidal thoughts or plan or intent to hurt yourself”, and patient also answered no to question of \"currently or since your last group meeting, have you had any homicidal thoughts or plan or intent to hurt others\".  Patient participated in discussion regarding changing morals and values over time.    On a 1:10 Scale (0-none, 10-high):    Anxiety: 0  Depression: 0  Cravings: 0    Assessment     Diagnoses and all orders for this visit:    1. Moderate alcohol use disorder (Primary)    2. Moderate cannabis use disorder    3. Moderate methamphetamine use " disorder             Progress toward goal: At goal    Functional Status: Moderate impairment     Prognosis: Good with Ongoing Treatment     Mental Status Exam:   Hygiene:   good  Cooperation:  Cooperative  Eye Contact:  Good  Psychomotor Behavior:  Appropriate  Affect:  Appropriate  Mood: normal  Speech:  Normal  Thought Process:  Linear  Thought Content:  Mood congruent  Suicidal:  None  Homicidal:  None  Hallucinations:  None  Delusion:  None  Memory:  Intact  Orientation:  Person, Place, Time, and Situation  Reliability:  fair  Insight:  fair  Judgement:  fair  Impulse Control:  fair  Physical/Medical Issues:  No      Assessment:  Engaged in activity/Process and self-disclosed: Yes  Affect:Appropriate   Applies topic to self: Yes  Able to give and receive feedback: Yes    Urinalysis: Negative  on preliminary results dated 10/07/2024  Labs:   Last Urine Toxicity  More data exists         Latest Ref Rng & Units 10/7/2024 10/4/2024   LAST URINE TOXICITY RESULTS   Amphetamine, Urine Qual Negative Negative  Negative    Barbiturates Screen, Urine Negative Negative  Negative    Benzodiazepine Screen, Urine Negative Negative  Negative    Buprenorphine, Screen, Urine Negative Negative  Negative    Cocaine Screen, Urine Negative Negative  Negative    Methadone Screen , Urine Negative Negative  Negative    Methamphetamine, Ur Negative Negative  Negative       Details                    Self-Reported days since last use: Patient check-in sheet reports 35    12-Step attendance: Patient check-in sheet reports zero 12-step meetings    Plan:   Patient will continue in weekly group psychotherapy sessions and individual outpatient psychotherapy sessions every 3-4 weeks and will continue in self-help meetings and seek additional treatment if necessary following completion.    Patient will continue in IOP Phase two and three group.      Safety plan has previously been discussed with patient.     TUCKER Riedr  [unfilled]  15:52 EDT

## 2024-10-14 ENCOUNTER — OFFICE VISIT (OUTPATIENT)
Dept: PSYCHIATRY | Facility: CLINIC | Age: 54
End: 2024-10-14
Payer: COMMERCIAL

## 2024-10-14 DIAGNOSIS — F10.20 MODERATE ALCOHOL USE DISORDER: Primary | ICD-10-CM

## 2024-10-14 DIAGNOSIS — F12.20 MODERATE CANNABIS USE DISORDER: ICD-10-CM

## 2024-10-14 DIAGNOSIS — F15.20 MODERATE METHAMPHETAMINE USE DISORDER: ICD-10-CM

## 2024-10-14 LAB
REF LAB TEST METHOD: NORMAL
REF LAB TEST METHOD: NORMAL

## 2024-10-14 PROCEDURE — 90853 GROUP PSYCHOTHERAPY: CPT | Performed by: COUNSELOR

## 2024-10-15 NOTE — PROGRESS NOTES
"[facility] PHASE II / Phase III GROUP NOTE    Name: Pieter William  Date: October 14, 2024    Time in:?3:30   Time out:?4:30   Participants: 11     Data: 1?hour group therapy session (Check ins, 12 step information and team building activity)         Clinical Maneuvering/Interventions:?Therapist utilized a person-centered, Motivation interviewing, and CBT approaches to build rapport, exploring and resolving ambivalence associated with commitment to change behaviors related to substance use with and addiction, group member.?Therapist implemented motivational interviewing techniques to assist client with, facilitate identification of maladaptive patterns of thinking and behavior that contribute to client's risk for continued substance use and relapse.?Therapist employed group interaction activities to build rapport among group members, promote sobriety, and emphasize relapse prevention.?Therapist promoted a safe nonjudgmental environment by providing group members with unconditional positive regard and encouraging group members to comply with group rules and guidelines. Therapist assisted group members with identifying and implementing healthier coping strategies.       Response:?Patient attended class in person. Patient participated in completion of check in form. Patient on check in form answered no to question \"currently or since last group meeting,?have you had any suicidal thoughts or plan or intent to hurt yourself”, and patient also answered no to question of \"currently or since your last group meeting, have you had any homicidal thoughts or plan or intent to hurt others\".  Patient participated fully in group discussion by identifying some past events that he struggled with and what he is doing to overcome them to the 12-step information activity.  Patient participated fully in the team building activity.    On a 1:10 Scale (0-none, 10-high):    Anxiety: 0  Depression: 0  Cravings: 0    Assessment     Diagnoses " and all orders for this visit:    1. Moderate alcohol use disorder (Primary)    2. Moderate cannabis use disorder    3. Moderate methamphetamine use disorder             Progress toward goal: At goal    Functional Status: Moderate impairment     Prognosis: Good with Ongoing Treatment     Mental Status Exam:   Hygiene:   good  Cooperation:  Cooperative  Eye Contact:  Good  Psychomotor Behavior:  Appropriate  Affect:  Appropriate  Mood: normal  Speech:  Normal  Thought Process:  Linear  Thought Content:  Mood congruent  Suicidal:  None  Homicidal:  None  Hallucinations:  None  Delusion:  None  Memory:  Intact  Orientation:  Person, Place, Time, and Situation  Reliability:  fair  Insight:  fair  Judgement:  fair  Impulse Control:  fair  Physical/Medical Issues:  No      Assessment:  Engaged in activity/Process and self-disclosed: Yes  Affect:Appropriate   Applies topic to self: Yes  Able to give and receive feedback: Yes    Urinalysis: Negative on confirmation result dated 10/7/2024  Labs:   Last Urine Toxicity  More data exists         Latest Ref Rng & Units 10/7/2024 10/4/2024   LAST URINE TOXICITY RESULTS   Amphetamine, Urine Qual Negative Negative  Negative    Barbiturates Screen, Urine Negative Negative  Negative    Benzodiazepine Screen, Urine Negative Negative  Negative    Buprenorphine, Screen, Urine Negative Negative  Negative    Cocaine Screen, Urine Negative Negative  Negative    Methadone Screen , Urine Negative Negative  Negative    Methamphetamine, Ur Negative Negative  Negative       Details                    Self-Reported days since last use: Patient check-in sheet reports 39 days    12-Step attendance: Patient check-in sheet reports zero 12-step meetings    Plan:   Patient will continue in weekly group psychotherapy sessions and individual outpatient psychotherapy sessions every 3-4 weeks and will continue in self-help meetings and seek additional treatment if necessary following  completion.    Patient will continue in IOP Phase two and three group.      Safety plan has previously been discussed with patient.     TUCKER Rider  [unfilled]  09:39 EDT

## 2024-10-17 ENCOUNTER — LAB (OUTPATIENT)
Dept: LAB | Facility: HOSPITAL | Age: 54
End: 2024-10-17
Payer: COMMERCIAL

## 2024-10-17 ENCOUNTER — OFFICE VISIT (OUTPATIENT)
Dept: PSYCHIATRY | Facility: CLINIC | Age: 54
End: 2024-10-17
Payer: COMMERCIAL

## 2024-10-17 DIAGNOSIS — F12.20 CANNABIS USE DISORDER, MODERATE, DEPENDENCE: ICD-10-CM

## 2024-10-17 DIAGNOSIS — F15.20 MODERATE METHAMPHETAMINE USE DISORDER: ICD-10-CM

## 2024-10-17 DIAGNOSIS — F12.20 MODERATE CANNABIS USE DISORDER: ICD-10-CM

## 2024-10-17 DIAGNOSIS — F10.20 ALCOHOL USE DISORDER, MODERATE, DEPENDENCE: ICD-10-CM

## 2024-10-17 DIAGNOSIS — F10.20 MODERATE ALCOHOL USE DISORDER: Primary | ICD-10-CM

## 2024-10-17 DIAGNOSIS — F15.20 METHAMPHETAMINE USE DISORDER, MODERATE: ICD-10-CM

## 2024-10-17 PROCEDURE — 80306 DRUG TEST PRSMV INSTRMNT: CPT

## 2024-10-17 PROCEDURE — 90853 GROUP PSYCHOTHERAPY: CPT | Performed by: COUNSELOR

## 2024-10-17 NOTE — PROGRESS NOTES
"[facility] PHASE II / Phase III GROUP NOTE    Name: Pieter William  Date: October 17, 2024    Time in:?3:30   Time out:?4:30   Participants: 6     Data: 1?hour group therapy session (Check ins, guilt and shame activity, and team building activity)         Clinical Maneuvering/Interventions:?Therapist utilized a person-centered, Motivation interviewing, and CBT approaches to build rapport, exploring and resolving ambivalence associated with commitment to change behaviors related to substance use with and addiction, group member.?Therapist implemented motivational interviewing techniques to assist client with, facilitate identification of maladaptive patterns of thinking and behavior that contribute to client's risk for continued substance use and relapse.?Therapist employed group interaction activities to build rapport among group members, promote sobriety, and emphasize relapse prevention.?Therapist promoted a safe nonjudgmental environment by providing group members with unconditional positive regard and encouraging group members to comply with group rules and guidelines. Therapist assisted group members with identifying and implementing healthier coping strategies.       Response:?Patient attended class in person. Patient participated in completion of check in form. Patient on check in form answered no to question \"currently or since last group meeting,?have you had any suicidal thoughts or plan or intent to hurt yourself”, and patient also answered no to question of \"currently or since your last group meeting, have you had any homicidal thoughts or plan or intent to hurt others\".  Patient participated in discussion about guilt and shame and how it affected them personally.    On a 1:10 Scale (0-none, 10-high):    Anxiety: 0  Depression: 0  Cravings: 0    Assessment     Diagnoses and all orders for this visit:    1. Moderate alcohol use disorder (Primary)    2. Moderate cannabis use disorder    3. Moderate " methamphetamine use disorder             Progress toward goal: At goal    Functional Status: Moderate impairment     Prognosis: Good with Ongoing Treatment     Mental Status Exam:   Hygiene:   good  Cooperation:  Cooperative  Eye Contact:  Good  Psychomotor Behavior:  Appropriate  Affect:  Appropriate  Mood: normal  Speech:  Normal  Thought Process:  Linear  Thought Content:  Mood congruent  Suicidal:  None  Homicidal:  None  Hallucinations:  None  Delusion:  None  Memory:  Intact  Orientation:  Person, Place, Time, and Situation  Reliability:  fair  Insight:  fair  Judgement:  fair  Impulse Control:  fair  Physical/Medical Issues:  No      Assessment:  Engaged in activity/Process and self-disclosed: Yes  Affect:Appropriate   Applies topic to self: Yes  Able to give and receive feedback: Yes    Urinalysis: Negative on confirmation results dated 10/7/2024.  Patient completed UDS dated 10/17/2024 but lab has not released results yet.  Labs:   Last Urine Toxicity  More data exists         Latest Ref Rng & Units 10/7/2024 10/4/2024   LAST URINE TOXICITY RESULTS   Amphetamine, Urine Qual Negative Negative  Negative    Barbiturates Screen, Urine Negative Negative  Negative    Benzodiazepine Screen, Urine Negative Negative  Negative    Buprenorphine, Screen, Urine Negative Negative  Negative    Cocaine Screen, Urine Negative Negative  Negative    Methadone Screen , Urine Negative Negative  Negative    Methamphetamine, Ur Negative Negative  Negative       Details                    Self-Reported days since last use: Patient check-in sheet reports 42    12-Step attendance: Patient check-in sheet reports zero 12-step meetings    Plan:   Patient will continue in weekly group psychotherapy sessions and individual outpatient psychotherapy sessions every 3-4 weeks and will continue in self-help meetings and seek additional treatment if necessary following completion.    Patient will continue in IOP Phase two and three group.       Safety plan has previously been discussed with patient.     TUCKER Rider  [unfilled]  15:54 EDT

## 2024-10-21 ENCOUNTER — OFFICE VISIT (OUTPATIENT)
Dept: PSYCHIATRY | Facility: CLINIC | Age: 54
End: 2024-10-21
Payer: COMMERCIAL

## 2024-10-21 ENCOUNTER — LAB (OUTPATIENT)
Dept: LAB | Facility: HOSPITAL | Age: 54
End: 2024-10-21
Payer: COMMERCIAL

## 2024-10-21 DIAGNOSIS — F10.20 ALCOHOL USE DISORDER, MODERATE, DEPENDENCE: ICD-10-CM

## 2024-10-21 DIAGNOSIS — F15.20 MODERATE METHAMPHETAMINE USE DISORDER: ICD-10-CM

## 2024-10-21 DIAGNOSIS — F12.20 MODERATE CANNABIS USE DISORDER: ICD-10-CM

## 2024-10-21 DIAGNOSIS — F15.20 METHAMPHETAMINE USE DISORDER, MODERATE: ICD-10-CM

## 2024-10-21 DIAGNOSIS — F12.20 CANNABIS USE DISORDER, MODERATE, DEPENDENCE: ICD-10-CM

## 2024-10-21 DIAGNOSIS — F10.20 MODERATE ALCOHOL USE DISORDER: Primary | ICD-10-CM

## 2024-10-21 PROCEDURE — 80306 DRUG TEST PRSMV INSTRMNT: CPT

## 2024-10-21 PROCEDURE — 90853 GROUP PSYCHOTHERAPY: CPT | Performed by: COUNSELOR

## 2024-10-22 LAB — REF LAB TEST METHOD: NORMAL

## 2024-10-22 NOTE — PROGRESS NOTES
"[facility] PHASE II / Phase III GROUP NOTE    Name: Pieter William  Date: October 21, 2024    Time in:?3:30   Time out:?4:30   Participants: 10     Data: 1?hour group therapy session (Check ins, and guilt and shame activity)         Clinical Maneuvering/Interventions:?Therapist utilized a person-centered, Motivation interviewing, and CBT approaches to build rapport, exploring and resolving ambivalence associated with commitment to change behaviors related to substance use with and addiction, group member.?Therapist implemented motivational interviewing techniques to assist client with, facilitate identification of maladaptive patterns of thinking and behavior that contribute to client's risk for continued substance use and relapse.?Therapist employed group interaction activities to build rapport among group members, promote sobriety, and emphasize relapse prevention.?Therapist promoted a safe nonjudgmental environment by providing group members with unconditional positive regard and encouraging group members to comply with group rules and guidelines. Therapist assisted group members with identifying and implementing healthier coping strategies.       Response:?Patient attended class in person. Patient participated in completion of check in form. Patient on check in form answered no to question \"currently or since last group meeting,?have you had any suicidal thoughts or plan or intent to hurt yourself”, and patient also answered no to question of \"currently or since your last group meeting, have you had any homicidal thoughts or plan or intent to hurt others\".  Patient participated fully in group discussion by identifying some guilt and shame he has experienced and some techniques that he is trying to practice to overcome that guilt and shame.    On a 1:10 Scale (0-none, 10-high):    Anxiety: 0  Depression: 0  Cravings: 0    Assessment     Diagnoses and all orders for this visit:    1. Moderate alcohol use disorder " (Primary)    2. Moderate cannabis use disorder    3. Moderate methamphetamine use disorder             Progress toward goal: At goal    Functional Status: Moderate impairment     Prognosis: Good with Ongoing Treatment     Mental Status Exam:   Hygiene:   good  Cooperation:  Cooperative  Eye Contact:  Good  Psychomotor Behavior:  Appropriate  Affect:  Appropriate  Mood: normal  Speech:  Normal  Thought Process:  Linear  Thought Content:  Mood incongruent  Suicidal:  None  Homicidal:  None  Hallucinations:  None  Delusion:  None  Memory:  Intact  Orientation:  Person, Place, Time, and Situation  Reliability:  fair  Insight:  fair  Judgement:  fair  Impulse Control:  fair  Physical/Medical Issues:  No      Assessment:  Engaged in activity/Process and self-disclosed: Yes  Affect:Appropriate   Applies topic to self: Yes  Able to give and receive feedback: Yes    Urinalysis: Negative on preliminary result dated 10/21/2024  Labs:   Last Urine Toxicity  More data exists         Latest Ref Rng & Units 10/21/2024 10/17/2024   LAST URINE TOXICITY RESULTS   Amphetamine, Urine Qual Negative Negative  Negative    Barbiturates Screen, Urine Negative Negative  Negative    Benzodiazepine Screen, Urine Negative Negative  Negative    Buprenorphine, Screen, Urine Negative Negative  Negative    Cocaine Screen, Urine Negative Negative  Negative    Methadone Screen , Urine Negative Negative  Negative    Methamphetamine, Ur Negative Negative  Negative       Details                    Self-Reported days since last use: Patient check-in sheet reports 46 days    12-Step attendance: Patient check-in sheet reports zero 12-step meetings    Plan:   Patient will continue in weekly group psychotherapy sessions and individual outpatient psychotherapy sessions every 3-4 weeks and will continue in self-help meetings and seek additional treatment if necessary following completion.    Patient will continue in IOP Phase two and three group.      Safety  plan has previously been discussed with patient.     TUCKER Rider  [unfilled]  07:47 EDT

## 2024-10-24 ENCOUNTER — OFFICE VISIT (OUTPATIENT)
Dept: PSYCHIATRY | Facility: CLINIC | Age: 54
End: 2024-10-24
Payer: COMMERCIAL

## 2024-10-24 DIAGNOSIS — F12.20 MODERATE CANNABIS USE DISORDER: ICD-10-CM

## 2024-10-24 DIAGNOSIS — F10.20 MODERATE ALCOHOL USE DISORDER: Primary | ICD-10-CM

## 2024-10-24 DIAGNOSIS — F15.20 MODERATE METHAMPHETAMINE USE DISORDER: ICD-10-CM

## 2024-10-24 PROCEDURE — 90853 GROUP PSYCHOTHERAPY: CPT | Performed by: COUNSELOR

## 2024-10-24 NOTE — PROGRESS NOTES
"[facility] PHASE II / Phase III GROUP NOTE    Name: Pieter William  Date: October 24, 2024    Time in:?3:30   Time out:?4:30   Participants: 8     Data: 1?hour group therapy session (Check ins, fair fighting rules, discussion on cravings, and relapse prevention plan)         Clinical Maneuvering/Interventions:?Therapist utilized a person-centered, Motivation interviewing, and CBT approaches to build rapport, exploring and resolving ambivalence associated with commitment to change behaviors related to substance use with and addiction, group member.?Therapist implemented motivational interviewing techniques to assist client with, facilitate identification of maladaptive patterns of thinking and behavior that contribute to client's risk for continued substance use and relapse.?Therapist employed group interaction activities to build rapport among group members, promote sobriety, and emphasize relapse prevention.?Therapist promoted a safe nonjudgmental environment by providing group members with unconditional positive regard and encouraging group members to comply with group rules and guidelines. Therapist assisted group members with identifying and implementing healthier coping strategies.       Response:?Patient attended class in person. Patient participated in completion of check in form. Patient on check in form answered no to question \"currently or since last group meeting,?have you had any suicidal thoughts or plan or intent to hurt yourself”, and patient also answered no to question of \"currently or since your last group meeting, have you had any homicidal thoughts or plan or intent to hurt others\".  Patient participated in discussion regarding fair fighting rules and coping with needs associated with triggers.    On a 1:10 Scale (0-none, 10-high):    Anxiety: 0  Depression: 0  Cravings: 0    Assessment     Diagnoses and all orders for this visit:    1. Moderate alcohol use disorder (Primary)    2. Moderate " cannabis use disorder    3. Moderate methamphetamine use disorder             Progress toward goal: At goal    Functional Status: Moderate impairment     Prognosis: Good with Ongoing Treatment     Mental Status Exam:   Hygiene:   good  Cooperation:  Cooperative  Eye Contact:  Good  Psychomotor Behavior:  Appropriate  Affect:  Appropriate  Mood: normal  Speech:  Normal  Thought Process:  Linear  Thought Content:  Normal  Suicidal:  None  Homicidal:  None  Hallucinations:  None  Delusion:  None  Memory:  Intact  Orientation:  Person, Place, Time, and Situation  Reliability:  fair  Insight:  fair  Judgement:  fair  Impulse Control:  fair  Physical/Medical Issues:  No      Assessment:  Engaged in activity/Process and self-disclosed: Yes  Affect:Appropriate   Applies topic to self: Yes  Able to give and receive feedback: Yes    Urinalysis: Negative on preliminary results dated 10/21/2024.  Labs:   Last Urine Toxicity  More data exists         Latest Ref Rng & Units 10/21/2024 10/17/2024   LAST URINE TOXICITY RESULTS   Amphetamine, Urine Qual Negative Negative  Negative    Barbiturates Screen, Urine Negative Negative  Negative    Benzodiazepine Screen, Urine Negative Negative  Negative    Buprenorphine, Screen, Urine Negative Negative  Negative    Cocaine Screen, Urine Negative Negative  Negative    Methadone Screen , Urine Negative Negative  Negative    Methamphetamine, Ur Negative Negative  Negative       Details                    Self-Reported days since last use: Patient check-in sheet reports 49    12-Step attendance: Patient check-in sheet reports zero 12-step meetings    Plan:   Patient will continue in weekly group psychotherapy sessions and individual outpatient psychotherapy sessions every 3-4 weeks and will continue in self-help meetings and seek additional treatment if necessary following completion.    Patient will continue in IOP Phase two and three group.      Safety plan has previously been discussed with  patient.     TUCKER Rider  [unfilled]  15:40 EDT

## 2024-10-25 LAB — REF LAB TEST METHOD: NORMAL

## 2024-10-28 ENCOUNTER — OFFICE VISIT (OUTPATIENT)
Dept: PSYCHIATRY | Facility: CLINIC | Age: 54
End: 2024-10-28
Payer: COMMERCIAL

## 2024-10-28 DIAGNOSIS — F12.20 MODERATE CANNABIS USE DISORDER: ICD-10-CM

## 2024-10-28 DIAGNOSIS — F15.20 MODERATE METHAMPHETAMINE USE DISORDER: ICD-10-CM

## 2024-10-28 DIAGNOSIS — F10.20 MODERATE ALCOHOL USE DISORDER: Primary | ICD-10-CM

## 2024-10-28 PROCEDURE — 90853 GROUP PSYCHOTHERAPY: CPT | Performed by: COUNSELOR

## 2024-10-28 NOTE — PROGRESS NOTES
"[facility] PHASE II / Phase III GROUP NOTE    Name: Pieter William  Date: October 28, 2024    Time in:?3:30   Time out:?4:30   Participants: 9     Data: 1?hour group therapy session (Check ins, discussion on cravings, and relapse prevention plan)         Clinical Maneuvering/Interventions:?Therapist utilized a person-centered, Motivation interviewing, and CBT approaches to build rapport, exploring and resolving ambivalence associated with commitment to change behaviors related to substance use with and addiction, group member.?Therapist implemented motivational interviewing techniques to assist client with, facilitate identification of maladaptive patterns of thinking and behavior that contribute to client's risk for continued substance use and relapse.?Therapist employed group interaction activities to build rapport among group members, promote sobriety, and emphasize relapse prevention.?Therapist promoted a safe nonjudgmental environment by providing group members with unconditional positive regard and encouraging group members to comply with group rules and guidelines. Therapist assisted group members with identifying and implementing healthier coping strategies.       Response:?Patient attended class in person. Patient participated in completion of check in form. Patient on check in form answered no to question \"currently or since last group meeting,?have you had any suicidal thoughts or plan or intent to hurt yourself”, and patient also answered no to question of \"currently or since your last group meeting, have you had any homicidal thoughts or plan or intent to hurt others\".      On a 1:10 Scale (0-none, 10-high):    Anxiety: 0  Depression: 0  Cravings: 0    Assessment     Diagnoses and all orders for this visit:    1. Moderate alcohol use disorder (Primary)    2. Moderate cannabis use disorder    3. Moderate methamphetamine use disorder             Progress toward goal: At goal    Functional Status: Moderate " impairment     Prognosis: Good with Ongoing Treatment     Mental Status Exam:   Hygiene:   good  Cooperation:  Cooperative  Eye Contact:  Good  Psychomotor Behavior:  Appropriate  Affect:  Appropriate  Mood: normal  Speech:  Normal  Thought Process:  Linear  Thought Content:  Mood congruent  Suicidal:  None  Homicidal:  None  Hallucinations:  None  Delusion:  None  Memory:  Intact  Orientation:  Person, Place, Time, and Situation  Reliability:  fair  Insight:  fair  Judgement:  fair  Impulse Control:  fair  Physical/Medical Issues:  No      Assessment:  Engaged in activity/Process and self-disclosed: Yes  Affect:Appropriate   Applies topic to self: Yes  Able to give and receive feedback: Yes    Urinalysis: Negative  on confirmation result dates 10/21/2024  Labs:   Last Urine Toxicity  More data exists         Latest Ref Rng & Units 10/21/2024 10/17/2024   LAST URINE TOXICITY RESULTS   Amphetamine, Urine Qual Negative Negative  Negative    Barbiturates Screen, Urine Negative Negative  Negative    Benzodiazepine Screen, Urine Negative Negative  Negative    Buprenorphine, Screen, Urine Negative Negative  Negative    Cocaine Screen, Urine Negative Negative  Negative    Methadone Screen , Urine Negative Negative  Negative    Methamphetamine, Ur Negative Negative  Negative       Details                    Self-Reported days since last use: Patient check-in sheet reports 53 days    12-Step attendance: Patient check-in sheet reports zero 12-step meetings    Plan:   Patient will continue in weekly group psychotherapy sessions and individual outpatient psychotherapy sessions every 3-4 weeks and will continue in self-help meetings and seek additional treatment if necessary following completion.    Patient will continue in IOP Phase two and three group.      Safety plan has previously been discussed with patient.     TUCKER Rider  [unfilled]  16:58 EDT

## 2024-10-31 ENCOUNTER — OFFICE VISIT (OUTPATIENT)
Dept: PSYCHIATRY | Facility: CLINIC | Age: 54
End: 2024-10-31
Payer: COMMERCIAL

## 2024-10-31 ENCOUNTER — LAB (OUTPATIENT)
Dept: LAB | Facility: HOSPITAL | Age: 54
End: 2024-10-31
Payer: COMMERCIAL

## 2024-10-31 DIAGNOSIS — F10.20 ALCOHOL USE DISORDER, MODERATE, DEPENDENCE: ICD-10-CM

## 2024-10-31 DIAGNOSIS — F10.20 MODERATE ALCOHOL USE DISORDER: Primary | ICD-10-CM

## 2024-10-31 DIAGNOSIS — F12.20 CANNABIS USE DISORDER, MODERATE, DEPENDENCE: ICD-10-CM

## 2024-10-31 DIAGNOSIS — F12.20 MODERATE CANNABIS USE DISORDER: ICD-10-CM

## 2024-10-31 DIAGNOSIS — F15.20 MODERATE METHAMPHETAMINE USE DISORDER: ICD-10-CM

## 2024-10-31 DIAGNOSIS — F15.20 METHAMPHETAMINE USE DISORDER, MODERATE: ICD-10-CM

## 2024-10-31 PROCEDURE — 90853 GROUP PSYCHOTHERAPY: CPT | Performed by: COUNSELOR

## 2024-10-31 PROCEDURE — 80306 DRUG TEST PRSMV INSTRMNT: CPT

## 2024-11-01 NOTE — PROGRESS NOTES
"[facility] PHASE II / Phase III GROUP NOTE    Name: Pieter William  Date: October 31, 2024    Time in:?3:30   Time out:?4:30   Participants: 9      Data: 1?hour group therapy session (Check ins, discussion on cravings, and relapse prevention plan)         Clinical Maneuvering/Interventions:?Therapist utilized a person-centered, Motivation interviewing, and CBT approaches to build rapport, exploring and resolving ambivalence associated with commitment to change behaviors related to substance use with and addiction, group member.?Therapist implemented motivational interviewing techniques to assist client with, facilitate identification of maladaptive patterns of thinking and behavior that contribute to client's risk for continued substance use and relapse.?Therapist employed group interaction activities to build rapport among group members, promote sobriety, and emphasize relapse prevention.?Therapist promoted a safe nonjudgmental environment by providing group members with unconditional positive regard and encouraging group members to comply with group rules and guidelines. Therapist assisted group members with identifying and implementing healthier coping strategies.       Response:?Patient attended class in person. Patient participated in completion of check in form. Patient on check in form answered no to question \"currently or since last group meeting,?have you had any suicidal thoughts or plan or intent to hurt yourself”, and patient also answered no to question of \"currently or since your last group meeting, have you had any homicidal thoughts or plan or intent to hurt others\".  Patient participated fully in group discussion by identifying what cravings are like for him, what steps he is taking his relapse prevention plan, and collaborated with other members of the group to demonstrate his knowledge.    On a 1:10 Scale (0-none, 10-high):    Anxiety: 0  Depression: 0  Cravings: 0    Assessment     Diagnoses and " all orders for this visit:    1. Moderate alcohol use disorder (Primary)    2. Moderate cannabis use disorder    3. Moderate methamphetamine use disorder             Progress toward goal: At goal    Functional Status: Moderate impairment     Prognosis: Good with Ongoing Treatment     Mental Status Exam:   Hygiene:   good  Cooperation:  Cooperative  Eye Contact:  Good  Psychomotor Behavior:  Appropriate  Affect:  Appropriate  Mood: normal  Speech:  Normal  Thought Process:  Linear  Thought Content:  Mood congruent  Suicidal:  None  Homicidal:  None  Hallucinations:  None  Delusion:  None  Memory:  Intact  Orientation:  Person, Place, Time, and Situation  Reliability:  fair  Insight:  fair  Judgement:  fair  Impulse Control:  fair  Physical/Medical Issues:  No      Assessment:  Engaged in activity/Process and self-disclosed: Yes  Affect:Appropriate   Applies topic to self: Yes  Able to give and receive feedback: Yes    Urinalysis: Negative on preliminary result dated 10/31/2024  Labs:   Last Urine Toxicity  More data exists         Latest Ref Rng & Units 10/31/2024 10/21/2024   LAST URINE TOXICITY RESULTS   Amphetamine, Urine Qual Negative Negative  Negative    Barbiturates Screen, Urine Negative Negative  Negative    Benzodiazepine Screen, Urine Negative Negative  Negative    Buprenorphine, Screen, Urine Negative Negative  Negative    Cocaine Screen, Urine Negative Negative  Negative    Methadone Screen , Urine Negative Negative  Negative    Methamphetamine, Ur Negative Negative  Negative       Details                    Self-Reported days since last use: Patient check-in sheet reports 56 days    12-Step attendance: Patient check-in sheet reports zero 12-step meetings    Plan:   Patient will continue in weekly group psychotherapy sessions and individual outpatient psychotherapy sessions every 3-4 weeks and will continue in self-help meetings and seek additional treatment if necessary following completion.    Patient  will continue in IOP Phase two and three group.      Safety plan has previously been discussed with patient.     TUCKER Rider  [unfilled]  07:42 EDT

## 2024-11-04 ENCOUNTER — LAB (OUTPATIENT)
Dept: LAB | Facility: HOSPITAL | Age: 54
End: 2024-11-04
Payer: COMMERCIAL

## 2024-11-04 ENCOUNTER — OFFICE VISIT (OUTPATIENT)
Dept: PSYCHIATRY | Facility: CLINIC | Age: 54
End: 2024-11-04
Payer: COMMERCIAL

## 2024-11-04 DIAGNOSIS — F10.20 MODERATE ALCOHOL USE DISORDER: Primary | ICD-10-CM

## 2024-11-04 DIAGNOSIS — F15.20 METHAMPHETAMINE USE DISORDER, MODERATE: ICD-10-CM

## 2024-11-04 DIAGNOSIS — F12.20 MODERATE CANNABIS USE DISORDER: ICD-10-CM

## 2024-11-04 DIAGNOSIS — F12.20 CANNABIS USE DISORDER, MODERATE, DEPENDENCE: ICD-10-CM

## 2024-11-04 DIAGNOSIS — F15.20 MODERATE METHAMPHETAMINE USE DISORDER: ICD-10-CM

## 2024-11-04 DIAGNOSIS — F10.20 ALCOHOL USE DISORDER, MODERATE, DEPENDENCE: ICD-10-CM

## 2024-11-04 PROCEDURE — 90853 GROUP PSYCHOTHERAPY: CPT | Performed by: COUNSELOR

## 2024-11-04 PROCEDURE — 80306 DRUG TEST PRSMV INSTRMNT: CPT

## 2024-11-04 NOTE — PROGRESS NOTES
"[facility] PHASE II / Phase III GROUP NOTE    Name: Pieter William  Date: November 4, 2024    Time in:?3:30   Time out:?4:30   Participants: 9     Data: 1?hour group therapy session (Check ins, what is addiction discussion, and reviewed group rules)     Clinical Maneuvering/Interventions:?Therapist utilized a person-centered, Motivation interviewing, and CBT approaches to build rapport, exploring and resolving ambivalence associated with commitment to change behaviors related to substance use with and addiction, group member.?Therapist implemented motivational interviewing techniques to assist client with, facilitate identification of maladaptive patterns of thinking and behavior that contribute to client's risk for continued substance use and relapse.?Therapist employed group interaction activities to build rapport among group members, promote sobriety, and emphasize relapse prevention.?Therapist promoted a safe nonjudgmental environment by providing group members with unconditional positive regard and encouraging group members to comply with group rules and guidelines. Therapist assisted group members with identifying and implementing healthier coping strategies.       Response:?Patient attended class in person. Patient participated in completion of check in form. Patient on check in form answered no to question \"currently or since last group meeting,?have you had any suicidal thoughts or plan or intent to hurt yourself”, and patient also answered no to question of \"currently or since your last group meeting, have you had any homicidal thoughts or plan or intent to hurt others\"    On a 1:10 Scale (0-none, 10-high):    Anxiety: 0  Depression: 0  Cravings: 0    Assessment     Diagnoses and all orders for this visit:    1. Moderate alcohol use disorder (Primary)    2. Moderate cannabis use disorder    3. Moderate methamphetamine use disorder             Progress toward goal: At goal    Functional Status: Moderate " impairment     Prognosis: Good with Ongoing Treatment     Mental Status Exam:   Hygiene:   good  Cooperation:  Cooperative  Eye Contact:  Good  Psychomotor Behavior:  Appropriate  Affect:  Appropriate  Mood: normal  Speech:  Normal  Thought Process:  Linear  Thought Content:  Mood congruent  Suicidal:  None  Homicidal:  None  Hallucinations:  None  Delusion:  None  Memory:  Intact  Orientation:  Person, Place, Time, and Situation  Reliability:  fair  Insight:  fair  Judgement:  fair  Impulse Control:  fair  Physical/Medical Issues:  No      Assessment:  Engaged in activity/Process and self-disclosed: Yes  Affect:Appropriate   Applies topic to self: Yes  Able to give and receive feedback: Yes    Urinalysis: Negative on preliminary result dated 11/4/2024  Labs:   Last Urine Toxicity  More data exists         Latest Ref Rng & Units 11/4/2024 10/31/2024   LAST URINE TOXICITY RESULTS   Amphetamine, Urine Qual Negative Negative  Negative    Barbiturates Screen, Urine Negative Negative  Negative    Benzodiazepine Screen, Urine Negative Negative  Negative    Buprenorphine, Screen, Urine Negative Negative  Negative    Cocaine Screen, Urine Negative Negative  Negative    Methadone Screen , Urine Negative Negative  Negative    Methamphetamine, Ur Negative Negative  Negative       Details                    Self-Reported days since last use: Patient check-in sheet reports 63 days    12-Step attendance: Patient check-in sheet reports zero 12-step meetings    Plan:   Patient will continue in weekly group psychotherapy sessions and individual outpatient psychotherapy sessions every 3-4 weeks and will continue in self-help meetings and seek additional treatment if necessary following completion.    Patient will continue in IOP Phase two and three group.      Safety plan has previously been discussed with patient.     TUCKER Rider  [unfilled]  17:25 EST

## 2024-11-06 LAB — REF LAB TEST METHOD: NORMAL

## 2024-11-07 ENCOUNTER — OFFICE VISIT (OUTPATIENT)
Dept: PSYCHIATRY | Facility: CLINIC | Age: 54
End: 2024-11-07
Payer: COMMERCIAL

## 2024-11-07 DIAGNOSIS — F15.20 MODERATE METHAMPHETAMINE USE DISORDER: ICD-10-CM

## 2024-11-07 DIAGNOSIS — F10.20 MODERATE ALCOHOL USE DISORDER: Primary | ICD-10-CM

## 2024-11-07 DIAGNOSIS — F12.20 MODERATE CANNABIS USE DISORDER: ICD-10-CM

## 2024-11-07 PROCEDURE — 90853 GROUP PSYCHOTHERAPY: CPT | Performed by: COUNSELOR

## 2024-11-07 NOTE — PROGRESS NOTES
"[facility] PHASE II / Phase III GROUP NOTE    Name: Pieter William  Date: November 7, 2024    Time in:?3:30   Time out:?4:30   Participants: 7     Data: 1?hour group therapy session (Check ins and triggers)     Clinical Maneuvering/Interventions:?Therapist utilized a person-centered, Motivation interviewing, and CBT approaches to build rapport, exploring and resolving ambivalence associated with commitment to change behaviors related to substance use with and addiction, group member.?Therapist implemented motivational interviewing techniques to assist client with, facilitate identification of maladaptive patterns of thinking and behavior that contribute to client's risk for continued substance use and relapse.?Therapist employed group interaction activities to build rapport among group members, promote sobriety, and emphasize relapse prevention.?Therapist promoted a safe nonjudgmental environment by providing group members with unconditional positive regard and encouraging group members to comply with group rules and guidelines. Therapist assisted group members with identifying and implementing healthier coping strategies.       Response:?Patient attended class in person. Patient participated in completion of check in form. Patient on check in form answered no to question \"currently or since last group meeting,?have you had any suicidal thoughts or plan or intent to hurt yourself”, and patient also answered no to question of \"currently or since your last group meeting, have you had any homicidal thoughts or plan or intent to hurt others\".  Patient participated in discussion about causes of triggers.    On a 1:10 Scale (0-none, 10-high):    Anxiety: 0  Depression: 0  Cravings: 0    Assessment     Diagnoses and all orders for this visit:    1. Moderate alcohol use disorder (Primary)    2. Moderate cannabis use disorder    3. Moderate methamphetamine use disorder             Progress toward goal: At goal    Functional " Status: Moderate impairment     Prognosis: Good with Ongoing Treatment     Mental Status Exam:   Hygiene:   good  Cooperation:  Cooperative  Eye Contact:  Good  Psychomotor Behavior:  Appropriate  Affect:  Appropriate  Mood: normal  Speech:  Normal  Thought Process:  Linear  Thought Content:  Normal  Suicidal:  None  Homicidal:  None  Hallucinations:  None  Delusion:  None  Memory:  Intact  Orientation:  Person, Place, Time, and Situation  Reliability:  fair  Insight:  fair  Judgement:  fair  Impulse Control:  fair  Physical/Medical Issues:  No      Assessment:  Engaged in activity/Process and self-disclosed: Yes  Affect:Appropriate   Applies topic to self: Yes  Able to give and receive feedback: Yes    Urinalysis: Negative on preliminary results dated 11/4/2024.  Labs:   Last Urine Toxicity  More data exists         Latest Ref Rng & Units 11/4/2024 10/31/2024   LAST URINE TOXICITY RESULTS   Amphetamine, Urine Qual Negative Negative  Negative    Barbiturates Screen, Urine Negative Negative  Negative    Benzodiazepine Screen, Urine Negative Negative  Negative    Buprenorphine, Screen, Urine Negative Negative  Negative    Cocaine Screen, Urine Negative Negative  Negative    Methadone Screen , Urine Negative Negative  Negative    Methamphetamine, Ur Negative Negative  Negative       Details                    Self-Reported days since last use: Patient check-in sheet reports 67    12-Step attendance: Patient check-in sheet reports zero 12-step meetings    Plan:   Patient will continue in weekly group psychotherapy sessions and individual outpatient psychotherapy sessions every 3-4 weeks and will continue in self-help meetings and seek additional treatment if necessary following completion.    Patient will continue in IOP Phase two and three group.      Safety plan has previously been discussed with patient.     TUCKER Rider  [unfilled]  16:08 EST

## 2024-11-11 ENCOUNTER — OFFICE VISIT (OUTPATIENT)
Dept: PSYCHIATRY | Facility: CLINIC | Age: 54
End: 2024-11-11
Payer: COMMERCIAL

## 2024-11-11 DIAGNOSIS — F10.20 MODERATE ALCOHOL USE DISORDER: Primary | ICD-10-CM

## 2024-11-11 DIAGNOSIS — F15.20 MODERATE METHAMPHETAMINE USE DISORDER: ICD-10-CM

## 2024-11-11 DIAGNOSIS — F12.20 MODERATE CANNABIS USE DISORDER: ICD-10-CM

## 2024-11-11 LAB — REF LAB TEST METHOD: NORMAL

## 2024-11-11 PROCEDURE — 90853 GROUP PSYCHOTHERAPY: CPT | Performed by: COUNSELOR

## 2024-11-11 NOTE — PROGRESS NOTES
"[facility] PHASE II / Phase III GROUP NOTE    Name: Pieter William  Date: November 11, 2024    Time in:?3:30   Time out:?4:30   Participants: 7     Data: 1?hour group therapy session (Check ins and triggers)     Clinical Maneuvering/Interventions:?Therapist utilized a person-centered, Motivation interviewing, and CBT approaches to build rapport, exploring and resolving ambivalence associated with commitment to change behaviors related to substance use with and addiction, group member.?Therapist implemented motivational interviewing techniques to assist client with, facilitate identification of maladaptive patterns of thinking and behavior that contribute to client's risk for continued substance use and relapse.?Therapist employed group interaction activities to build rapport among group members, promote sobriety, and emphasize relapse prevention.?Therapist promoted a safe nonjudgmental environment by providing group members with unconditional positive regard and encouraging group members to comply with group rules and guidelines. Therapist assisted group members with identifying and implementing healthier coping strategies.       Response:?Patient attended class in person. Patient participated in completion of check in form. Patient on check in form answered no to question \"currently or since last group meeting,?have you had any suicidal thoughts or plan or intent to hurt yourself”, and patient also answered no to question of \"currently or since your last group meeting, have you had any homicidal thoughts or plan or intent to hurt others\". Patient expressed their triggers and locations that they find difficulties with and identified techniques to assist with overcoming these triggers     On a 1:10 Scale (0-none, 10-high):    Anxiety: 0  Depression: 0  Cravings: 0    Assessment     Diagnoses and all orders for this visit:    1. Moderate alcohol use disorder (Primary)    2. Moderate cannabis use disorder    3. " Moderate methamphetamine use disorder             Progress toward goal: At goal    Functional Status: Moderate impairment     Prognosis: Good with Ongoing Treatment     Mental Status Exam:   Hygiene:   good  Cooperation:  Cooperative  Eye Contact:  Good  Psychomotor Behavior:  Appropriate  Affect:  Appropriate  Mood: anxious  Speech:  Normal  Thought Process:  Linear  Thought Content:  Mood congruent  Suicidal:  None  Homicidal:  None  Hallucinations:  None  Delusion:  None  Memory:  Intact  Orientation:  Person, Place, Time, and Situation  Reliability:  fair  Insight:  fair  Judgement:  fair  Impulse Control:  fair  Physical/Medical Issues:  No      Assessment:  Engaged in activity/Process and self-disclosed: Yes  Affect:Appropriate   Applies topic to self: Yes  Able to give and receive feedback: Yes    Urinalysis: Negative on confirmation result dated 11/4/2024  Labs:   Last Urine Toxicity  More data exists         Latest Ref Rng & Units 11/4/2024 10/31/2024   LAST URINE TOXICITY RESULTS   Amphetamine, Urine Qual Negative Negative  Negative    Barbiturates Screen, Urine Negative Negative  Negative    Benzodiazepine Screen, Urine Negative Negative  Negative    Buprenorphine, Screen, Urine Negative Negative  Negative    Cocaine Screen, Urine Negative Negative  Negative    Methadone Screen , Urine Negative Negative  Negative    Methamphetamine, Ur Negative Negative  Negative       Details                    Self-Reported days since last use: Patient check-in sheet reports 67 days    12-Step attendance: Patient check-in sheet reports zero 12-step meetings    Plan:   Patient will continue in weekly group psychotherapy sessions and individual outpatient psychotherapy sessions every 3-4 weeks and will continue in self-help meetings and seek additional treatment if necessary following completion.    Patient will continue in IOP Phase two and three group.      Safety plan has previously been discussed with patient.      Leno Pereyra, TUCKER  [unfilled]  16:01 EST

## 2024-11-14 ENCOUNTER — LAB (OUTPATIENT)
Dept: LAB | Facility: HOSPITAL | Age: 54
End: 2024-11-14
Payer: COMMERCIAL

## 2024-11-14 ENCOUNTER — OFFICE VISIT (OUTPATIENT)
Dept: PSYCHIATRY | Facility: CLINIC | Age: 54
End: 2024-11-14
Payer: COMMERCIAL

## 2024-11-14 DIAGNOSIS — F15.20 MODERATE METHAMPHETAMINE USE DISORDER: ICD-10-CM

## 2024-11-14 DIAGNOSIS — F12.20 CANNABIS USE DISORDER, MODERATE, DEPENDENCE: ICD-10-CM

## 2024-11-14 DIAGNOSIS — F10.20 MODERATE ALCOHOL USE DISORDER: Primary | ICD-10-CM

## 2024-11-14 DIAGNOSIS — F10.20 ALCOHOL USE DISORDER, MODERATE, DEPENDENCE: ICD-10-CM

## 2024-11-14 DIAGNOSIS — F15.20 METHAMPHETAMINE USE DISORDER, MODERATE: ICD-10-CM

## 2024-11-14 PROCEDURE — 90853 GROUP PSYCHOTHERAPY: CPT | Performed by: COUNSELOR

## 2024-11-14 PROCEDURE — 80306 DRUG TEST PRSMV INSTRMNT: CPT

## 2024-11-14 NOTE — PROGRESS NOTES
"[facility] PHASE II / Phase III GROUP NOTE    Name: Pieter William  Date: November 14, 2024    Time in:?3:30   Time out:?4:30   Participants: 9     Data: 1?hour group therapy session (Check ins and self-care)     Clinical Maneuvering/Interventions:?Therapist utilized a person-centered, Motivation interviewing, and CBT approaches to build rapport, exploring and resolving ambivalence associated with commitment to change behaviors related to substance use with and addiction, group member.?Therapist implemented motivational interviewing techniques to assist client with, facilitate identification of maladaptive patterns of thinking and behavior that contribute to client's risk for continued substance use and relapse.?Therapist employed group interaction activities to build rapport among group members, promote sobriety, and emphasize relapse prevention.?Therapist promoted a safe nonjudgmental environment by providing group members with unconditional positive regard and encouraging group members to comply with group rules and guidelines. Therapist assisted group members with identifying and implementing healthier coping strategies.       Response:?Patient attended class in person. Patient participated in completion of check in form. Patient on check in form answered no to question \"currently or since last group meeting,?have you had any suicidal thoughts or plan or intent to hurt yourself”, and patient also answered no to question of \"currently or since your last group meeting, have you had any homicidal thoughts or plan or intent to hurt others\".  Patient participated in the discussion about the importance of self-care.    On a 1:10 Scale (0-none, 10-high):    Anxiety: 0  Depression: 0  Cravings: 0    Assessment     Diagnoses and all orders for this visit:    1. Moderate alcohol use disorder (Primary)    2. Moderate methamphetamine use disorder             Progress toward goal: At goal    Functional Status: Moderate " impairment     Prognosis: Good with Ongoing Treatment     Mental Status Exam:   Hygiene:   good  Cooperation:  Cooperative  Eye Contact:  Good  Psychomotor Behavior:  Appropriate  Affect:  Appropriate  Mood: normal  Speech:  Normal  Thought Process:  Linear  Thought Content:  Normal  Suicidal:  None  Homicidal:  None  Hallucinations:  None  Delusion:  None  Memory:  Intact  Orientation:  Person, Place, Time, and Situation  Reliability:  fair  Insight:  fair  Judgement:  fair  Impulse Control:  fair  Physical/Medical Issues:  No      Assessment:  Engaged in activity/Process and self-disclosed: Yes  Affect:Appropriate   Applies topic to self: Yes  Able to give and receive feedback: Yes    Urinalysis: Negative on confirmation results dated 11/4/2024.  Patient completed UDS on 11/14/2024 but the lab has not released results yet.  Labs:   Last Urine Toxicity  More data exists         Latest Ref Rng & Units 11/4/2024 10/31/2024   LAST URINE TOXICITY RESULTS   Amphetamine, Urine Qual Negative Negative  Negative    Barbiturates Screen, Urine Negative Negative  Negative    Benzodiazepine Screen, Urine Negative Negative  Negative    Buprenorphine, Screen, Urine Negative Negative  Negative    Cocaine Screen, Urine Negative Negative  Negative    Methadone Screen , Urine Negative Negative  Negative    Methamphetamine, Ur Negative Negative  Negative       Details                    Self-Reported days since last use: Patient check-in sheet reports 69 days    12-Step attendance: Patient check-in sheet reports zero 12-step meetings    Plan:   Patient will continue in weekly group psychotherapy sessions and individual outpatient psychotherapy sessions every 3-4 weeks and will continue in self-help meetings and seek additional treatment if necessary following completion.    Patient will continue in IOP Phase two and three group.      Safety plan has previously been discussed with patient.     TUCKER Rider  [unfilled]  16:01 EST

## 2024-11-18 ENCOUNTER — OFFICE VISIT (OUTPATIENT)
Dept: PSYCHIATRY | Facility: CLINIC | Age: 54
End: 2024-11-18
Payer: COMMERCIAL

## 2024-11-18 ENCOUNTER — LAB (OUTPATIENT)
Dept: LAB | Facility: HOSPITAL | Age: 54
End: 2024-11-18
Payer: COMMERCIAL

## 2024-11-18 DIAGNOSIS — F15.20 METHAMPHETAMINE USE DISORDER, MODERATE: ICD-10-CM

## 2024-11-18 DIAGNOSIS — F15.20 MODERATE METHAMPHETAMINE USE DISORDER: ICD-10-CM

## 2024-11-18 DIAGNOSIS — F10.20 MODERATE ALCOHOL USE DISORDER: Primary | ICD-10-CM

## 2024-11-18 DIAGNOSIS — F12.20 CANNABIS USE DISORDER, MODERATE, DEPENDENCE: ICD-10-CM

## 2024-11-18 DIAGNOSIS — F12.20 MODERATE CANNABIS USE DISORDER: ICD-10-CM

## 2024-11-18 DIAGNOSIS — F10.20 ALCOHOL USE DISORDER, MODERATE, DEPENDENCE: ICD-10-CM

## 2024-11-18 PROCEDURE — 80306 DRUG TEST PRSMV INSTRMNT: CPT

## 2024-11-18 PROCEDURE — 90853 GROUP PSYCHOTHERAPY: CPT | Performed by: COUNSELOR

## 2024-11-18 NOTE — PROGRESS NOTES
"[facility] PHASE II / Phase III GROUP NOTE    Name: Pieter William  Date: November 18, 2024    Time in:?3:30   Time out:?4:30   Participants: 10     Data: 1?hour group therapy session (Check ins and inner strength)     Clinical Maneuvering/Interventions:?Therapist utilized a person-centered, Motivation interviewing, and CBT approaches to build rapport, exploring and resolving ambivalence associated with commitment to change behaviors related to substance use with and addiction, group member.?Therapist implemented motivational interviewing techniques to assist client with, facilitate identification of maladaptive patterns of thinking and behavior that contribute to client's risk for continued substance use and relapse.?Therapist employed group interaction activities to build rapport among group members, promote sobriety, and emphasize relapse prevention.?Therapist promoted a safe nonjudgmental environment by providing group members with unconditional positive regard and encouraging group members to comply with group rules and guidelines. Therapist assisted group members with identifying and implementing healthier coping strategies.        Response:?Patient attended class in person. Patient participated in completion of check in form. Patient on check in form answered no to question \"currently or since last group meeting,?have you had any suicidal thoughts or plan or intent to hurt yourself”, and patient also answered no to question of \"currently or since your last group meeting, have you had any homicidal thoughts or plan or intent to hurt others\".  Patient participated fully in group discussion by identifying some inner strength that he has and how is implementing them in his daily life.    On a 1:10 Scale (0-none, 10-high):    Anxiety: 0  Depression: 0  Cravings: 0    Assessment     Diagnoses and all orders for this visit:    1. Moderate alcohol use disorder (Primary)    2. Moderate methamphetamine use " disorder    3. Moderate cannabis use disorder             Progress toward goal: At goal    Functional Status: Moderate impairment     Prognosis: Good with Ongoing Treatment     Mental Status Exam:   Hygiene:   good  Cooperation:  Cooperative  Eye Contact:  Good  Psychomotor Behavior:  Appropriate  Affect:  Appropriate  Mood: normal  Speech:  Normal  Thought Process:  Linear  Thought Content:  Mood congruent  Suicidal:  None  Homicidal:  None  Hallucinations:  None  Delusion:  None  Memory:  Intact  Orientation:  Person, Place, Time, and Situation  Reliability:  fair  Insight:  fair  Judgement:  fair  Impulse Control:  fair  Physical/Medical Issues:  No      Assessment:  Engaged in activity/Process and self-disclosed: Yes  Affect:Appropriate   Applies topic to self: Yes  Able to give and receive feedback: Yes    Urinalysis: Negative on preliminary result dated 11/18/2024  Labs:   Last Urine Toxicity  More data exists         Latest Ref Rng & Units 11/14/2024 11/4/2024   LAST URINE TOXICITY RESULTS   Amphetamine, Urine Qual Negative Negative  Negative    Barbiturates Screen, Urine Negative Negative  Negative    Benzodiazepine Screen, Urine Negative Negative  Negative    Buprenorphine, Screen, Urine Negative Negative  Negative    Cocaine Screen, Urine Negative Negative  Negative    Methadone Screen , Urine Negative Negative  Negative    Methamphetamine, Ur Negative Negative  Negative       Details                    Self-Reported days since last use: Patient check-in sheet reports 74 days    12-Step attendance: Patient check-in sheet reports zero 12-step meetings    Plan:   Patient will continue in weekly group psychotherapy sessions and individual outpatient psychotherapy sessions every 3-4 weeks and will continue in self-help meetings and seek additional treatment if necessary following completion.    Patient will continue in IOP Phase two and three group.      Safety plan has previously been discussed with patient.      Leno Pereyra, TUCKER  [unfilled]  16:45 EST

## 2024-11-20 LAB — REF LAB TEST METHOD: NORMAL

## 2024-11-21 ENCOUNTER — OFFICE VISIT (OUTPATIENT)
Dept: PSYCHIATRY | Facility: CLINIC | Age: 54
End: 2024-11-21
Payer: COMMERCIAL

## 2024-11-21 DIAGNOSIS — F12.20 MODERATE CANNABIS USE DISORDER: ICD-10-CM

## 2024-11-21 DIAGNOSIS — F10.20 MODERATE ALCOHOL USE DISORDER: Primary | ICD-10-CM

## 2024-11-21 DIAGNOSIS — F15.20 MODERATE METHAMPHETAMINE USE DISORDER: ICD-10-CM

## 2024-11-21 PROCEDURE — 90853 GROUP PSYCHOTHERAPY: CPT | Performed by: COUNSELOR

## 2024-11-22 NOTE — PROGRESS NOTES
"[facility] PHASE II / Phase III GROUP NOTE    Name: Pieter William  Date: November 21, 2024    Time in:?3:30   Time out:?4:30   Participants: 11     Data: 1?hour group therapy session (Check ins, inner strength, LOURDES talk video on hope in recovery)     Clinical Maneuvering/Interventions:?Therapist utilized a person-centered, Motivation interviewing, and CBT approaches to build rapport, exploring and resolving ambivalence associated with commitment to change behaviors related to substance use with and addiction, group member.?Therapist implemented motivational interviewing techniques to assist client with, facilitate identification of maladaptive patterns of thinking and behavior that contribute to client's risk for continued substance use and relapse.?Therapist employed group interaction activities to build rapport among group members, promote sobriety, and emphasize relapse prevention.?Therapist promoted a safe nonjudgmental environment by providing group members with unconditional positive regard and encouraging group members to comply with group rules and guidelines. Therapist assisted group members with identifying and implementing healthier coping strategies.        Response:?Patient attended class in person. Patient participated in completion of check in form. Patient on check in form answered no to question \"currently or since last group meeting,?have you had any suicidal thoughts or plan or intent to hurt yourself”, and patient also answered no to question of \"currently or since your last group meeting, have you had any homicidal thoughts or plan or intent to hurt others\".    On a 1:10 Scale (0-none, 10-high):    Anxiety: 0  Depression: 0  Cravings: 0    Assessment     Diagnoses and all orders for this visit:    1. Moderate alcohol use disorder (Primary)    2. Moderate methamphetamine use disorder    3. Moderate cannabis use disorder             Progress toward goal: At goal    Functional Status: Moderate " impairment     Prognosis: Good with Ongoing Treatment     Mental Status Exam:   Hygiene:   good  Cooperation:  Cooperative  Eye Contact:  Good  Psychomotor Behavior:  Appropriate  Affect:  Appropriate  Mood: normal  Speech:  Normal  Thought Process:  Linear  Thought Content:  Mood congruent  Suicidal:  None  Homicidal:  None  Hallucinations:  None  Delusion:  None  Memory:  Intact  Orientation:  Person, Place, Time, and Situation  Reliability:  fair  Insight:  fair  Judgement:  fair  Impulse Control:  fair  Physical/Medical Issues:  No      Assessment:  Engaged in activity/Process and self-disclosed: Yes  Affect:Appropriate   Applies topic to self: Yes  Able to give and receive feedback: Yes    Urinalysis: Negative  on preliminary result 11/18/2024  Labs:   Last Urine Toxicity  More data exists         Latest Ref Rng & Units 11/18/2024 11/14/2024   LAST URINE TOXICITY RESULTS   Amphetamine, Urine Qual Negative Negative  Negative    Barbiturates Screen, Urine Negative Negative  Negative    Benzodiazepine Screen, Urine Negative Negative  Negative    Buprenorphine, Screen, Urine Negative Negative  Negative    Cocaine Screen, Urine Negative Negative  Negative    Methadone Screen , Urine Negative Negative  Negative    Methamphetamine, Ur Negative Negative  Negative       Details                    Self-Reported days since last use: Patient check-in sheet reports 78 days    12-Step attendance: Patient check-in sheet reports zero 12-step meetings    Plan:   Patient will continue in weekly group psychotherapy sessions and individual outpatient psychotherapy sessions every 3-4 weeks and will continue in self-help meetings and seek additional treatment if necessary following completion.    Patient will continue in IOP Phase two and three group.      Safety plan has previously been discussed with patient.     TUCKER Rider  [unfilled]  07:13 EST

## 2024-11-25 LAB — REF LAB TEST METHOD: NORMAL

## 2024-11-27 ENCOUNTER — OFFICE VISIT (OUTPATIENT)
Dept: PSYCHIATRY | Facility: CLINIC | Age: 54
End: 2024-11-27
Payer: COMMERCIAL

## 2024-11-27 ENCOUNTER — LAB (OUTPATIENT)
Dept: LAB | Facility: HOSPITAL | Age: 54
End: 2024-11-27
Payer: COMMERCIAL

## 2024-11-27 DIAGNOSIS — F10.20 MODERATE ALCOHOL USE DISORDER: Primary | ICD-10-CM

## 2024-11-27 DIAGNOSIS — F12.20 CANNABIS USE DISORDER, MODERATE, DEPENDENCE: ICD-10-CM

## 2024-11-27 DIAGNOSIS — F15.20 MODERATE METHAMPHETAMINE USE DISORDER: ICD-10-CM

## 2024-11-27 DIAGNOSIS — F15.20 METHAMPHETAMINE USE DISORDER, MODERATE: ICD-10-CM

## 2024-11-27 DIAGNOSIS — F12.20 MODERATE CANNABIS USE DISORDER: ICD-10-CM

## 2024-11-27 DIAGNOSIS — F10.20 ALCOHOL USE DISORDER, MODERATE, DEPENDENCE: ICD-10-CM

## 2024-11-27 PROCEDURE — 80306 DRUG TEST PRSMV INSTRMNT: CPT

## 2024-11-27 PROCEDURE — 90853 GROUP PSYCHOTHERAPY: CPT | Performed by: COUNSELOR

## 2024-11-27 NOTE — PROGRESS NOTES
"[facility] PHASE II / Phase III GROUP NOTE    Name: Pieter William  Date: November 27, 2024    Time in:?3:30   Time out:?4:30   Participants: 11     Data: 1?hour group therapy session (Check ins and [facility]OUR[facility] talk video on hope in recovery)     Clinical Maneuvering/Interventions:?Therapist utilized a person-centered, Motivation interviewing, and CBT approaches to build rapport, exploring and resolving ambivalence associated with commitment to change behaviors related to substance use with and addiction, group member.?Therapist implemented motivational interviewing techniques to assist client with, facilitate identification of maladaptive patterns of thinking and behavior that contribute to client's risk for continued substance use and relapse.?Therapist employed group interaction activities to build rapport among group members, promote sobriety, and emphasize relapse prevention.?Therapist promoted a safe nonjudgmental environment by providing group members with unconditional positive regard and encouraging group members to comply with group rules and guidelines. Therapist assisted group members with identifying and implementing healthier coping strategies.        Response:?Patient attended class in person. Patient participated in completion of check in form. Patient on check in form answered no to question \"currently or since last group meeting,?have you had any suicidal thoughts or plan or intent to hurt yourself”, and patient also answered no to question of \"currently or since your last group meeting, have you had any homicidal thoughts or plan or intent to hurt others\".      On a 1:10 Scale (0-none, 10-high):    Anxiety: 0  Depression: 0  Cravings: 0    Assessment     Diagnoses and all orders for this visit:    1. Moderate alcohol use disorder (Primary)    2. Moderate methamphetamine use disorder    3. Moderate cannabis use disorder             Progress toward goal: At goal    Functional Status: Moderate impairment "     Prognosis: Good with Ongoing Treatment     Mental Status Exam:   Hygiene:   good  Cooperation:  Cooperative  Eye Contact:  Good  Psychomotor Behavior:  Appropriate  Affect:  Appropriate  Mood: normal  Speech:  Normal  Thought Process:  Linear  Thought Content:  Normal  Suicidal:  None  Homicidal:  None  Hallucinations:  None  Delusion:  None  Memory:  Intact  Orientation:  Person, Place, Time, and Situation  Reliability:  fair  Insight:  fair  Judgement:  fair  Impulse Control:  fair  Physical/Medical Issues:  No      Assessment:  Engaged in activity/Process and self-disclosed: Yes  Affect:Appropriate   Applies topic to self: Yes  Able to give and receive feedback: Yes    Urinalysis: Negative on preliminary result dated 11/27/2024  Labs:   Last Urine Toxicity  More data exists         Latest Ref Rng & Units 11/18/2024 11/14/2024   LAST URINE TOXICITY RESULTS   Amphetamine, Urine Qual Negative Negative  Negative    Barbiturates Screen, Urine Negative Negative  Negative    Benzodiazepine Screen, Urine Negative Negative  Negative    Buprenorphine, Screen, Urine Negative Negative  Negative    Cocaine Screen, Urine Negative Negative  Negative    Methadone Screen , Urine Negative Negative  Negative    Methamphetamine, Ur Negative Negative  Negative       Details                    Self-Reported days since last use: Patient check-in sheet reports 84 days    12-Step attendance: Patient check-in sheet reports zero 12-step meetings    Plan:   Patient will continue in weekly group psychotherapy sessions and individual outpatient psychotherapy sessions every 3-4 weeks and will continue in self-help meetings and seek additional treatment if necessary following completion.    Patient will continue in IOP Phase two and three group.      Safety plan has previously been discussed with patient.     TUCKER Rider  [unfilled]  18:11 EST

## 2024-12-02 ENCOUNTER — LAB (OUTPATIENT)
Dept: LAB | Facility: HOSPITAL | Age: 54
End: 2024-12-02
Payer: COMMERCIAL

## 2024-12-02 ENCOUNTER — OFFICE VISIT (OUTPATIENT)
Dept: PSYCHIATRY | Facility: CLINIC | Age: 54
End: 2024-12-02
Payer: COMMERCIAL

## 2024-12-02 DIAGNOSIS — F15.20 METHAMPHETAMINE USE DISORDER, MODERATE: ICD-10-CM

## 2024-12-02 DIAGNOSIS — F10.20 MODERATE ALCOHOL USE DISORDER: Primary | ICD-10-CM

## 2024-12-02 DIAGNOSIS — F12.20 MODERATE CANNABIS USE DISORDER: ICD-10-CM

## 2024-12-02 DIAGNOSIS — F15.20 MODERATE METHAMPHETAMINE USE DISORDER: ICD-10-CM

## 2024-12-02 DIAGNOSIS — F10.20 ALCOHOL USE DISORDER, MODERATE, DEPENDENCE: ICD-10-CM

## 2024-12-02 DIAGNOSIS — F12.20 CANNABIS USE DISORDER, MODERATE, DEPENDENCE: ICD-10-CM

## 2024-12-02 LAB
AMPHET+METHAMPHET UR QL: POSITIVE
AMPHETAMINES UR QL: POSITIVE
BARBITURATES UR QL SCN: NEGATIVE
BENZODIAZ UR QL SCN: NEGATIVE
BUPRENORPHINE SERPL-MCNC: NEGATIVE NG/ML
CANNABINOIDS SERPL QL: NEGATIVE
COCAINE UR QL: NEGATIVE
METHADONE UR QL SCN: NEGATIVE
OPIATES UR QL: NEGATIVE
OXYCODONE UR QL SCN: NEGATIVE
PCP UR QL SCN: NEGATIVE
REF LAB TEST METHOD: NORMAL
TRICYCLICS UR QL SCN: NEGATIVE

## 2024-12-02 PROCEDURE — 80306 DRUG TEST PRSMV INSTRMNT: CPT

## 2024-12-02 PROCEDURE — 90853 GROUP PSYCHOTHERAPY: CPT | Performed by: COUNSELOR

## 2024-12-02 NOTE — PROGRESS NOTES
"[facility] PHASE II / Phase III GROUP NOTE    Name: Pieter William  Date: December 2, 2024    Time in:?3:30   Time out:?4:30   Participants: 11     Data: 1?hour group therapy session (Check ins and illness in recovery)     Clinical Maneuvering/Interventions:?Therapist utilized a person-centered, Motivation interviewing, and CBT approaches to build rapport, exploring and resolving ambivalence associated with commitment to change behaviors related to substance use with and addiction, group member.?Therapist implemented motivational interviewing techniques to assist client with, facilitate identification of maladaptive patterns of thinking and behavior that contribute to client's risk for continued substance use and relapse.?Therapist employed group interaction activities to build rapport among group members, promote sobriety, and emphasize relapse prevention.?Therapist promoted a safe nonjudgmental environment by providing group members with unconditional positive regard and encouraging group members to comply with group rules and guidelines. Therapist assisted group members with identifying and implementing healthier coping strategies.        Response:?Patient attended class in person. Patient participated in completion of check in form. Patient on check in form answered no to question \"currently or since last group meeting,?have you had any suicidal thoughts or plan or intent to hurt yourself”, and patient also answered no to question of \"currently or since your last group meeting, have you had any homicidal thoughts or plan or intent to hurt others\".  Patient participated fully in group discussion by identifying how illnesses has affected his sobriety in the past and actions he is taking to overcome triggers in the future.  Patient was supportive of other members of the group and provided positive feedback.    On a 1:10 Scale (0-none, 10-high):    Anxiety: 0  Depression: 0  Cravings: 0    Assessment     Diagnoses " and all orders for this visit:    1. Moderate alcohol use disorder (Primary)    2. Moderate methamphetamine use disorder    3. Moderate cannabis use disorder             Progress toward goal: At goal    Functional Status: Moderate impairment     Prognosis: Good with Ongoing Treatment     Mental Status Exam:   Hygiene:   good  Cooperation:  Cooperative  Eye Contact:  Good  Psychomotor Behavior:  Appropriate  Affect:  Appropriate  Mood: normal  Speech:  Normal  Thought Process:  Linear  Thought Content:  Mood congruent  Suicidal:  None  Homicidal:  None  Hallucinations:  None  Delusion:  None  Memory:  Intact  Orientation:  Person, Place, Time, and Situation  Reliability:  fair  Insight:  fair  Judgement:  fair  Impulse Control:  fair  Physical/Medical Issues:  No      Assessment:  Engaged in activity/Process and self-disclosed: Yes  Affect:Appropriate   Applies topic to self: Yes  Able to give and receive feedback: Yes    Urinalysis: Positive methamphetamine and amphetamine on preliminary result dated 12/30/2024  Labs:   Last Urine Toxicity  More data exists         Latest Ref Rng & Units 11/27/2024 11/18/2024   LAST URINE TOXICITY RESULTS   Amphetamine, Urine Qual Negative Negative  Negative    Barbiturates Screen, Urine Negative Negative  Negative    Benzodiazepine Screen, Urine Negative Negative  Negative    Buprenorphine, Screen, Urine Negative Negative  Negative    Cocaine Screen, Urine Negative Negative  Negative    Methadone Screen , Urine Negative Negative  Negative    Methamphetamine, Ur Negative Negative  Negative       Details                    Self-Reported days since last use: Patient check-in sheet reports 88 days    12-Step attendance: Patient check-in sheet reports zero 12-step meetings    Plan:   Patient will continue in weekly group psychotherapy sessions and individual outpatient psychotherapy sessions every 3-4 weeks and will continue in self-help meetings and seek additional treatment if  necessary following completion.    Patient will continue in IOP Phase two and three group.      Safety plan has previously been discussed with patient.     TUCKER Rider  [unfilled]  17:45 EST

## 2024-12-05 ENCOUNTER — OFFICE VISIT (OUTPATIENT)
Dept: PSYCHIATRY | Facility: CLINIC | Age: 54
End: 2024-12-05
Payer: COMMERCIAL

## 2024-12-05 DIAGNOSIS — F10.20 MODERATE ALCOHOL USE DISORDER: Primary | ICD-10-CM

## 2024-12-05 DIAGNOSIS — F12.20 MODERATE CANNABIS USE DISORDER: ICD-10-CM

## 2024-12-05 DIAGNOSIS — F15.20 MODERATE METHAMPHETAMINE USE DISORDER: ICD-10-CM

## 2024-12-05 PROCEDURE — 90853 GROUP PSYCHOTHERAPY: CPT | Performed by: COUNSELOR

## 2024-12-06 NOTE — PROGRESS NOTES
"[facility] PHASE II / Phase III GROUP NOTE    Name: Pieter William  Date: December 5, 2024    Time in:?3:30   Time out:?4:30   Participants: 10     Data: 1?hour group therapy session (Check ins and anxiety)     Clinical Maneuvering/Interventions:?Therapist utilized a person-centered, Motivation interviewing, and CBT approaches to build rapport, exploring and resolving ambivalence associated with commitment to change behaviors related to substance use with and addiction, group member.?Therapist implemented motivational interviewing techniques to assist client with, facilitate identification of maladaptive patterns of thinking and behavior that contribute to client's risk for continued substance use and relapse.?Therapist employed group interaction activities to build rapport among group members, promote sobriety, and emphasize relapse prevention.?Therapist promoted a safe nonjudgmental environment by providing group members with unconditional positive regard and encouraging group members to comply with group rules and guidelines. Therapist assisted group members with identifying and implementing healthier coping strategies.        Response:?Patient attended class in person. Patient participated in completion of check in form. Patient on check in form answered no to question \"currently or since last group meeting,?have you had any suicidal thoughts or plan or intent to hurt yourself”, and patient also answered no to question of \"currently or since your last group meeting, have you had any homicidal thoughts or plan or intent to hurt others\".  Patient participated fully in group discussion by identifying some anxiety triggers that he struggling with.  Patient reported that he did use methamphetamine.  Patient self disclose that he lost his job and is struggling financially to get through the situation he is in.  Patient was informed that he will get referred out for case management to receive resources to help him work " through some of the things that he is lost and to help him maybe get more insurance to help him cover the cost.  Patient identified a safety plan to reach out for help and to practice communicating what he is going through more frequently to help him not relapse.  Patient was given his last chance during this conversation and reviewed the ramifications of what he is at risk of if he uses a substance again throughout the program.    On a 1:10 Scale (0-none, 10-high):    Anxiety: 0  Depression: 0  Cravings: 0    Assessment     Diagnoses and all orders for this visit:    1. Moderate alcohol use disorder (Primary)    2. Moderate methamphetamine use disorder    3. Moderate cannabis use disorder             Progress toward goal: At goal    Functional Status: Moderate impairment     Prognosis: Good with Ongoing Treatment     Mental Status Exam:   Hygiene:   good  Cooperation:  Cooperative  Eye Contact:  Good  Psychomotor Behavior:  Appropriate  Affect:  Appropriate  Mood: anxious  Speech:  Normal  Thought Process:  Linear  Thought Content:  Mood congruent  Suicidal:  None  Homicidal:  None  Hallucinations:  None  Delusion:  None  Memory:  Intact  Orientation:  Person, Place, Time, and Situation  Reliability:  fair  Insight:  fair  Judgement:  fair  Impulse Control:  fair  Physical/Medical Issues:  No      Assessment:  Engaged in activity/Process and self-disclosed: Yes  Affect:Appropriate   Applies topic to self: Yes  Able to give and receive feedback: Yes    Urinalysis: Positive with methamphetamine and amphetamine on preliminary result dated 12/02/2024  Labs:   Last Urine Toxicity  More data exists         Latest Ref Rng & Units 12/2/2024 11/27/2024   LAST URINE TOXICITY RESULTS   Amphetamine, Urine Qual Negative Positive  Negative    Barbiturates Screen, Urine Negative Negative  Negative    Benzodiazepine Screen, Urine Negative Negative  Negative    Buprenorphine, Screen, Urine Negative Negative  Negative    Cocaine  Screen, Urine Negative Negative  Negative    Methadone Screen , Urine Negative Negative  Negative    Methamphetamine, Ur Negative Positive  Negative       Details                    Self-Reported days since last use: Patient check-in sheet reports 11 days    12-Step attendance: Patient check-in sheet reports zero 12-step meetings    Plan:   Patient will continue in weekly group psychotherapy sessions and individual outpatient psychotherapy sessions every 3-4 weeks and will continue in self-help meetings and seek additional treatment if necessary following completion.    Patient will continue in IOP Phase two and three group.      Safety plan has previously been discussed with patient.     TUCKER Rider  [unfilled]  11:11 EST

## 2024-12-09 ENCOUNTER — OFFICE VISIT (OUTPATIENT)
Dept: PSYCHIATRY | Facility: CLINIC | Age: 54
End: 2024-12-09
Payer: COMMERCIAL

## 2024-12-09 DIAGNOSIS — F15.20 MODERATE METHAMPHETAMINE USE DISORDER: ICD-10-CM

## 2024-12-09 DIAGNOSIS — F12.20 MODERATE CANNABIS USE DISORDER: ICD-10-CM

## 2024-12-09 DIAGNOSIS — F10.20 MODERATE ALCOHOL USE DISORDER: Primary | ICD-10-CM

## 2024-12-09 LAB — REF LAB TEST METHOD: NORMAL

## 2024-12-09 PROCEDURE — 90853 GROUP PSYCHOTHERAPY: CPT | Performed by: COUNSELOR

## 2024-12-09 NOTE — PROGRESS NOTES
"[facility] PHASE II / Phase III GROUP NOTE    Name: Pieter William  Date: December 9, 2024    Time in:?3:30   Time out:?4:30   Participants: 10    Data: 1?hour group therapy session (Check ins and anxiety)    Clinical Maneuvering/Interventions:?Therapist utilized a person-centered, Motivation interviewing, and CBT approaches to build rapport, exploring and resolving ambivalence associated with commitment to change behaviors related to substance use with and addiction, group member.?Therapist implemented motivational interviewing techniques to assist client with, facilitate identification of maladaptive patterns of thinking and behavior that contribute to client's risk for continued substance use and relapse.?Therapist employed group interaction activities to build rapport among group members, promote sobriety, and emphasize relapse prevention.?Therapist promoted a safe nonjudgmental environment by providing group members with unconditional positive regard and encouraging group members to comply with group rules and guidelines. Therapist assisted group members with identifying and implementing healthier coping strategies.        Response:?Patient attended class in person. Patient participated in completion of check in form. Patient on check in form answered no to question \"currently or since last group meeting,?have you had any suicidal thoughts or plan or intent to hurt yourself”, and patient also answered no to question of \"currently or since your last group meeting, have you had any homicidal thoughts or plan or intent to hurt others\".  Patient participated fully in group discussion by identifying some anxiety triggers that he experiences.  Patient identified some coping skills as been practicing overcome them.    On a 1:10 Scale (0-none, 10-high):    Anxiety: 0  Depression: 0  Cravings: 0    Assessment     Diagnoses and all orders for this visit:    1. Moderate alcohol use disorder (Primary)    2. Moderate " cannabis use disorder    3. Moderate methamphetamine use disorder             Progress toward goal: At goal    Functional Status: Moderate impairment     Prognosis: Good with Ongoing Treatment     Mental Status Exam:   Hygiene:   good  Cooperation:  Cooperative  Eye Contact:  Good  Psychomotor Behavior:  Appropriate  Affect:  Appropriate  Mood: normal  Speech:  Normal  Thought Process:  Linear  Thought Content:  Mood congruent  Suicidal:  None  Homicidal:  None  Hallucinations:  None  Delusion:  None  Memory:  Intact  Orientation:  Person, Place, Time, and Situation  Reliability:  fair  Insight:  fair  Judgement:  fair  Impulse Control:  fair  Physical/Medical Issues:  No      Assessment:  Engaged in activity/Process and self-disclosed: Yes  Affect:Appropriate   Applies topic to self: Yes  Able to give and receive feedback: Yes    Urinalysis: Positive amphetamine on confirmation result dated 12/02/2024  Labs:   Last Urine Toxicity  More data exists         Latest Ref Rng & Units 12/2/2024 11/27/2024   LAST URINE TOXICITY RESULTS   Amphetamine, Urine Qual Negative Positive  Negative    Barbiturates Screen, Urine Negative Negative  Negative    Benzodiazepine Screen, Urine Negative Negative  Negative    Buprenorphine, Screen, Urine Negative Negative  Negative    Cocaine Screen, Urine Negative Negative  Negative    Methadone Screen , Urine Negative Negative  Negative    Methamphetamine, Ur Negative Positive  Negative       Details                    Self-Reported days since last use: Patient check-in sheet reports 8 days    12-Step attendance: Patient check-in sheet reports zero 12-step meetings    Plan:   Patient will continue in weekly group psychotherapy sessions and individual outpatient psychotherapy sessions every 3-4 weeks and will continue in self-help meetings and seek additional treatment if necessary following completion.    Patient will continue in IOP Phase two and three group.      Safety plan has  previously been discussed with patient.     TUCKER Rider  [unfilled]  18:11 EST

## 2024-12-12 ENCOUNTER — OFFICE VISIT (OUTPATIENT)
Dept: PSYCHIATRY | Facility: CLINIC | Age: 54
End: 2024-12-12
Payer: COMMERCIAL

## 2024-12-12 ENCOUNTER — LAB (OUTPATIENT)
Dept: LAB | Facility: HOSPITAL | Age: 54
End: 2024-12-12
Payer: COMMERCIAL

## 2024-12-12 DIAGNOSIS — F10.20 ALCOHOL USE DISORDER, MODERATE, DEPENDENCE: ICD-10-CM

## 2024-12-12 DIAGNOSIS — F15.20 MODERATE METHAMPHETAMINE USE DISORDER: ICD-10-CM

## 2024-12-12 DIAGNOSIS — F12.20 MODERATE CANNABIS USE DISORDER: ICD-10-CM

## 2024-12-12 DIAGNOSIS — F15.20 METHAMPHETAMINE USE DISORDER, MODERATE: ICD-10-CM

## 2024-12-12 DIAGNOSIS — F10.20 MODERATE ALCOHOL USE DISORDER: Primary | ICD-10-CM

## 2024-12-12 DIAGNOSIS — F12.20 CANNABIS USE DISORDER, MODERATE, DEPENDENCE: ICD-10-CM

## 2024-12-12 PROCEDURE — 90853 GROUP PSYCHOTHERAPY: CPT | Performed by: COUNSELOR

## 2024-12-12 PROCEDURE — 80306 DRUG TEST PRSMV INSTRMNT: CPT

## 2024-12-16 ENCOUNTER — LAB (OUTPATIENT)
Dept: LAB | Facility: HOSPITAL | Age: 54
End: 2024-12-16
Payer: COMMERCIAL

## 2024-12-16 ENCOUNTER — OFFICE VISIT (OUTPATIENT)
Dept: PSYCHIATRY | Facility: CLINIC | Age: 54
End: 2024-12-16
Payer: COMMERCIAL

## 2024-12-16 DIAGNOSIS — F10.20 MODERATE ALCOHOL USE DISORDER: Primary | ICD-10-CM

## 2024-12-16 DIAGNOSIS — F12.20 MODERATE CANNABIS USE DISORDER: ICD-10-CM

## 2024-12-16 DIAGNOSIS — F12.20 CANNABIS USE DISORDER, MODERATE, DEPENDENCE: ICD-10-CM

## 2024-12-16 DIAGNOSIS — F10.20 ALCOHOL USE DISORDER, MODERATE, DEPENDENCE: ICD-10-CM

## 2024-12-16 DIAGNOSIS — F15.20 METHAMPHETAMINE USE DISORDER, MODERATE: ICD-10-CM

## 2024-12-16 DIAGNOSIS — F15.20 MODERATE METHAMPHETAMINE USE DISORDER: ICD-10-CM

## 2024-12-16 PROCEDURE — 80306 DRUG TEST PRSMV INSTRMNT: CPT

## 2024-12-16 PROCEDURE — 90853 GROUP PSYCHOTHERAPY: CPT | Performed by: COUNSELOR

## 2024-12-16 NOTE — PROGRESS NOTES
"[facility] PHASE II / Phase III GROUP NOTE    Name: Pieter William  Date: December 16, 2024    Time in:?3:30   Time out:?4:30   Participants: 11     Data: 1?hour group therapy session (Check ins and [employer]: attitude made simple from [employer])     Clinical Maneuvering/Interventions:?Therapist utilized a person-centered, Motivation interviewing, and CBT approaches to build rapport, exploring and resolving ambivalence associated with commitment to change behaviors related to substance use with and addiction, group member.?Therapist implemented motivational interviewing techniques to assist client with, facilitate identification of maladaptive patterns of thinking and behavior that contribute to client's risk for continued substance use and relapse.?Therapist employed group interaction activities to build rapport among group members, promote sobriety, and emphasize relapse prevention.?Therapist promoted a safe nonjudgmental environment by providing group members with unconditional positive regard and encouraging group members to comply with group rules and guidelines. Therapist assisted group members with identifying and implementing healthier coping strategies.        Response:?Patient attended class in person. Patient participated in completion of check in form. Patient on check in form answered no to question \"currently or since last group meeting,?have you had any suicidal thoughts or plan or intent to hurt yourself”, and patient also answered no to question of \"currently or since your last group meeting, have you had any homicidal thoughts or plan or intent to hurt others\".  Patient participated fully in group discussion by identifying some negative thoughts he struggles with and practice positive self-talk to overcome negative thoughts.    On a 1:10 Scale (0-none, 10-high):    Anxiety: 0  Depression: 0  Cravings: 0    Assessment     Diagnoses and all orders for this visit:    1. Moderate " alcohol use disorder (Primary)    2. Moderate methamphetamine use disorder    3. Moderate cannabis use disorder             Progress toward goal: At goal    Functional Status: Moderate impairment     Prognosis: Good with Ongoing Treatment     Mental Status Exam:   Hygiene:   good  Cooperation:  Cooperative  Eye Contact:  Good  Psychomotor Behavior:  Appropriate  Affect:  Appropriate  Mood: normal  Speech:  Normal  Thought Process:  Linear  Thought Content:  Mood congruent  Suicidal:  None  Homicidal:  None  Hallucinations:  None  Delusion:  None  Memory:  Intact  Orientation:  Person, Place, Time, and Situation  Reliability:  fair  Insight:  fair  Judgement:  fair  Impulse Control:  fair  Physical/Medical Issues:  No      Assessment:  Engaged in activity/Process and self-disclosed: Yes  Affect:Appropriate   Applies topic to self: Yes  Able to give and receive feedback: Yes    Urinalysis: Negative on preliminary result dated 12/16/2024  Labs:   Last Urine Toxicity  More data exists         Latest Ref Rng & Units 12/12/2024 12/2/2024   LAST URINE TOXICITY RESULTS   Amphetamine, Urine Qual Negative Negative  Positive    Barbiturates Screen, Urine Negative Negative  Negative    Benzodiazepine Screen, Urine Negative Negative  Negative    Buprenorphine, Screen, Urine Negative Negative  Negative    Cocaine Screen, Urine Negative Negative  Negative    Methadone Screen , Urine Negative Negative  Negative    Methamphetamine, Ur Negative Negative  Positive       Details                    Self-Reported days since last use: Patient check-in sheet reports 15 days    12-Step attendance: Patient check-in sheet reports zero 12-step meetings    Plan:   Patient will continue in weekly group psychotherapy sessions and individual outpatient psychotherapy sessions every 3-4 weeks and will continue in self-help meetings and seek additional treatment if necessary following completion.    Patient will continue in IOP Phase two and three  group.      Safety plan has previously been discussed with patient.     TUCKER Rider  [unfilled]  16:17 EST

## 2024-12-16 NOTE — PROGRESS NOTES
"[facility] PHASE II / Phase III GROUP NOTE    Name: Pieter William  Date: December 12, 2024    Time in:?3:30   Time out:?4:30   Participants: 10     Data: 1?hour group therapy session (Check ins and anxiety)     Clinical Maneuvering/Interventions:?Therapist utilized a person-centered, Motivation interviewing, and CBT approaches to build rapport, exploring and resolving ambivalence associated with commitment to change behaviors related to substance use with and addiction, group member.?Therapist implemented motivational interviewing techniques to assist client with, facilitate identification of maladaptive patterns of thinking and behavior that contribute to client's risk for continued substance use and relapse.?Therapist employed group interaction activities to build rapport among group members, promote sobriety, and emphasize relapse prevention.?Therapist promoted a safe nonjudgmental environment by providing group members with unconditional positive regard and encouraging group members to comply with group rules and guidelines. Therapist assisted group members with identifying and implementing healthier coping strategies.        Response:?Patient attended class in person. Patient participated in completion of check in form. Patient on check in form answered no to question \"currently or since last group meeting,?have you had any suicidal thoughts or plan or intent to hurt yourself”, and patient also answered no to question of \"currently or since your last group meeting, have you had any homicidal thoughts or plan or intent to hurt others\".  Patient identified some anxiety triggers.  Patient identified some ways to cope with anxiety and gave positive feedback to other members of the group.    On a 1:10 Scale (0-none, 10-high):    Anxiety: 0  Depression: 0  Cravings: 0    Assessment     Diagnoses and all orders for this visit:    1. Moderate alcohol use disorder (Primary)    2. Moderate cannabis use disorder    3. " Moderate methamphetamine use disorder             Progress toward goal: At goal    Functional Status: Moderate impairment     Prognosis: Good with Ongoing Treatment     Mental Status Exam:   Hygiene:   good  Cooperation:  Cooperative  Eye Contact:  Good  Psychomotor Behavior:  Appropriate  Affect:  Appropriate  Mood: normal  Speech:  Normal  Thought Process:  Linear  Thought Content:  Mood congruent  Suicidal:  None  Homicidal:  None  Hallucinations:  None  Delusion:  None  Memory:  Intact  Orientation:  Person, Place, Time, and Situation  Reliability:  fair  Insight:  fair  Judgement:  fair  Impulse Control:  fair  Physical/Medical Issues:  No      Assessment:  Engaged in activity/Process and self-disclosed: Yes  Affect:Appropriate   Applies topic to self: Yes  Able to give and receive feedback: Yes    Urinalysis: Negative on preliminary result dated 12/12/2024  Labs:   Last Urine Toxicity  More data exists         Latest Ref Rng & Units 12/12/2024 12/2/2024   LAST URINE TOXICITY RESULTS   Amphetamine, Urine Qual Negative Negative  Positive    Barbiturates Screen, Urine Negative Negative  Negative    Benzodiazepine Screen, Urine Negative Negative  Negative    Buprenorphine, Screen, Urine Negative Negative  Negative    Cocaine Screen, Urine Negative Negative  Negative    Methadone Screen , Urine Negative Negative  Negative    Methamphetamine, Ur Negative Negative  Positive       Details                    Self-Reported days since last use: Patient check-in sheet reports 11 days    12-Step attendance: Patient check-in sheet reports one 12-step meeting    Plan:   Patient will continue in weekly group psychotherapy sessions and individual outpatient psychotherapy sessions every 3-4 weeks and will continue in self-help meetings and seek additional treatment if necessary following completion.    Patient will continue in IOP Phase two and three group.      Safety plan has previously been discussed with patient.     Leno  TUCKER Pereyra  [unfilled]  06:47 EST

## 2024-12-17 LAB — REF LAB TEST METHOD: NORMAL

## 2024-12-19 ENCOUNTER — OFFICE VISIT (OUTPATIENT)
Dept: PSYCHIATRY | Facility: CLINIC | Age: 54
End: 2024-12-19
Payer: COMMERCIAL

## 2024-12-19 DIAGNOSIS — F12.20 MODERATE CANNABIS USE DISORDER: ICD-10-CM

## 2024-12-19 DIAGNOSIS — F10.20 MODERATE ALCOHOL USE DISORDER: ICD-10-CM

## 2024-12-19 DIAGNOSIS — F15.20 MODERATE METHAMPHETAMINE USE DISORDER: Primary | ICD-10-CM

## 2024-12-19 PROCEDURE — 90853 GROUP PSYCHOTHERAPY: CPT | Performed by: COUNSELOR

## 2024-12-20 LAB — REF LAB TEST METHOD: NORMAL

## 2024-12-20 NOTE — PROGRESS NOTES
"[facility] PHASE II / Phase III GROUP NOTE    Name: Pieter William  Date: December 19, 2024    Time in:?3:30   Time out:?4:30   Participants: 07     Data: 1?hour group therapy session (Check ins and Ziggy talk with discussion about shame)     Clinical Maneuvering/Interventions:?Therapist utilized a person-centered, Motivation interviewing, and CBT approaches to build rapport, exploring and resolving ambivalence associated with commitment to change behaviors related to substance use with and addiction, group member.?Therapist implemented motivational interviewing techniques to assist client with, facilitate identification of maladaptive patterns of thinking and behavior that contribute to client's risk for continued substance use and relapse.?Therapist employed group interaction activities to build rapport among group members, promote sobriety, and emphasize relapse prevention.?Therapist promoted a safe nonjudgmental environment by providing group members with unconditional positive regard and encouraging group members to comply with group rules and guidelines. Therapist assisted group members with identifying and implementing healthier coping strategies.        Response:?Patient attended class in person. Patient participated in completion of check in form. Patient on check in form answered no to question \"currently or since last group meeting,?have you had any suicidal thoughts or plan or intent to hurt yourself”, and patient also answered no to question of \"currently or since your last group meeting, have you had any homicidal thoughts or plan or intent to hurt others\".      On a 1:10 Scale (0-none, 10-high):    Anxiety: 0  Depression: 0  Cravings: 0    Assessment     Diagnoses and all orders for this visit:    1. Moderate methamphetamine use disorder (Primary)    2. Moderate alcohol use disorder    3. Moderate cannabis use disorder             Progress toward goal: At goal    Functional Status: Moderate impairment "     Prognosis: Good with Ongoing Treatment     Mental Status Exam:   Hygiene:   good  Cooperation:  Cooperative  Eye Contact:  Good  Psychomotor Behavior:  Appropriate  Affect:  Appropriate  Mood: normal  Speech:  Normal  Thought Process:  Linear  Thought Content:  Mood congruent  Suicidal:  None  Homicidal:  None  Hallucinations:  None  Delusion:  None  Memory:  Intact  Orientation:  Person, Place, Time, and Situation  Reliability:  fair  Insight:  fair  Judgement:  fair  Impulse Control:  fair  Physical/Medical Issues:  No      Assessment:  Engaged in activity/Process and self-disclosed: Yes  Affect:Appropriate   Applies topic to self: Yes  Able to give and receive feedback: Yes    Urinalysis: Negative on preliminary result dated 12/16/2024  Labs:   Last Urine Toxicity  More data exists         Latest Ref Rng & Units 12/16/2024 12/12/2024   LAST URINE TOXICITY RESULTS   Amphetamine, Urine Qual Negative Negative  Negative    Barbiturates Screen, Urine Negative Negative  Negative    Benzodiazepine Screen, Urine Negative Negative  Negative    Buprenorphine, Screen, Urine Negative Negative  Negative    Cocaine Screen, Urine Negative Negative  Negative    Methadone Screen , Urine Negative Negative  Negative    Methamphetamine, Ur Negative Negative  Negative       Details                    Self-Reported days since last use: Patient check-in sheet reports 17 days    12-Step attendance: Patient check-in sheet reports zero 12-step meetings    Plan:   Patient will continue in weekly group psychotherapy sessions and individual outpatient psychotherapy sessions every 3-4 weeks and will continue in self-help meetings and seek additional treatment if necessary following completion.    Patient will continue in IOP Phase two and three group.      Safety plan has previously been discussed with patient.     TUCKER Rider  [unfilled]  07:57 EST

## 2024-12-23 ENCOUNTER — OFFICE VISIT (OUTPATIENT)
Dept: PSYCHIATRY | Facility: CLINIC | Age: 54
End: 2024-12-23
Payer: COMMERCIAL

## 2024-12-23 DIAGNOSIS — F10.20 MODERATE ALCOHOL USE DISORDER: ICD-10-CM

## 2024-12-23 DIAGNOSIS — F15.20 MODERATE METHAMPHETAMINE USE DISORDER: Primary | ICD-10-CM

## 2024-12-23 DIAGNOSIS — F12.20 MODERATE CANNABIS USE DISORDER: ICD-10-CM

## 2024-12-23 PROCEDURE — 90853 GROUP PSYCHOTHERAPY: CPT | Performed by: COUNSELOR

## 2024-12-23 NOTE — PROGRESS NOTES
"[facility] PHASE II / Phase III GROUP NOTE    Name: Pieter William  Date: December 23, 2024    Time in:?3:30   Time out:?4:30   Participants: 07     Data: 1?hour group therapy session (Check ins team, building activity, and personal recovery plans)     Clinical Maneuvering/Interventions:?Therapist utilized a person-centered, Motivation interviewing, and CBT approaches to build rapport, exploring and resolving ambivalence associated with commitment to change behaviors related to substance use with and addiction, group member.?Therapist implemented motivational interviewing techniques to assist client with, facilitate identification of maladaptive patterns of thinking and behavior that contribute to client's risk for continued substance use and relapse.?Therapist employed group interaction activities to build rapport among group members, promote sobriety, and emphasize relapse prevention.?Therapist promoted a safe nonjudgmental environment by providing group members with unconditional positive regard and encouraging group members to comply with group rules and guidelines. Therapist assisted group members with identifying and implementing healthier coping strategies.        Response:?Patient attended class in person. Patient participated in completion of check in form. Patient on check in form answered no to question \"currently or since last group meeting,?have you had any suicidal thoughts or plan or intent to hurt yourself”, and patient also answered no to question of \"currently or since your last group meeting, have you had any homicidal thoughts or plan or intent to hurt others\".      On a 1:10 Scale (0-none, 10-high):    Anxiety: 10  Depression: 0  Cravings: 0    Assessment     Diagnoses and all orders for this visit:    1. Moderate methamphetamine use disorder (Primary)    2. Moderate alcohol use disorder    3. Moderate cannabis use disorder             Progress toward goal: At goal    Functional Status: Moderate " impairment     Prognosis: Good with Ongoing Treatment     Mental Status Exam:   Hygiene:   good  Cooperation:  Cooperative  Eye Contact:  Good  Psychomotor Behavior:  Appropriate  Affect:  Appropriate  Mood: anxious  Speech:  Normal  Thought Process:  Linear  Thought Content:  Mood congruent  Suicidal:  None  Homicidal:  None  Hallucinations:  None  Delusion:  None  Memory:  Intact  Orientation:  Person, Place, Time, and Situation  Reliability:  fair  Insight:  fair  Judgement:  fair  Impulse Control:  fair  Physical/Medical Issues:  No      Assessment:  Engaged in activity/Process and self-disclosed: Yes  Affect:Appropriate   Applies topic to self: Yes  Able to give and receive feedback: Yes    Urinalysis: Negative on confirmation result dated 12/16/2024  Labs:   Last Urine Toxicity  More data exists         Latest Ref Rng & Units 12/16/2024 12/12/2024   LAST URINE TOXICITY RESULTS   Amphetamine, Urine Qual Negative Negative  Negative    Barbiturates Screen, Urine Negative Negative  Negative    Benzodiazepine Screen, Urine Negative Negative  Negative    Buprenorphine, Screen, Urine Negative Negative  Negative    Cocaine Screen, Urine Negative Negative  Negative    Methadone Screen , Urine Negative Negative  Negative    Methamphetamine, Ur Negative Negative  Negative       Details                    Self-Reported days since last use: Patient check-in sheet reports 21 days    12-Step attendance: Patient check-in sheet reports zero 12-step meetings    Plan:   Patient will continue in weekly group psychotherapy sessions and individual outpatient psychotherapy sessions every 3-4 weeks and will continue in self-help meetings and seek additional treatment if necessary following completion.    Patient will continue in IOP Phase two and three group.      Safety plan has previously been discussed with patient.     TUCKER Rider  [unfilled]  17:54 EST

## 2024-12-26 ENCOUNTER — OFFICE VISIT (OUTPATIENT)
Dept: PSYCHIATRY | Facility: CLINIC | Age: 54
End: 2024-12-26
Payer: COMMERCIAL

## 2024-12-26 ENCOUNTER — LAB (OUTPATIENT)
Dept: LAB | Facility: HOSPITAL | Age: 54
End: 2024-12-26
Payer: COMMERCIAL

## 2024-12-26 DIAGNOSIS — F15.20 MODERATE METHAMPHETAMINE USE DISORDER: Primary | ICD-10-CM

## 2024-12-26 DIAGNOSIS — F10.20 MODERATE ALCOHOL USE DISORDER: ICD-10-CM

## 2024-12-26 DIAGNOSIS — F12.20 CANNABIS USE DISORDER, MODERATE, DEPENDENCE: ICD-10-CM

## 2024-12-26 DIAGNOSIS — F15.20 METHAMPHETAMINE USE DISORDER, MODERATE: ICD-10-CM

## 2024-12-26 DIAGNOSIS — F12.20 MODERATE CANNABIS USE DISORDER: ICD-10-CM

## 2024-12-26 DIAGNOSIS — F10.20 ALCOHOL USE DISORDER, MODERATE, DEPENDENCE: ICD-10-CM

## 2024-12-26 PROCEDURE — 80306 DRUG TEST PRSMV INSTRMNT: CPT

## 2024-12-26 PROCEDURE — 90853 GROUP PSYCHOTHERAPY: CPT | Performed by: COUNSELOR

## 2024-12-26 NOTE — PROGRESS NOTES
"[facility] PHASE II / Phase III GROUP NOTE    Name: Pieter William  Date: December 26, 2024    Time in:?3:30   Time out:?4:30   Participants: 10     Data: 1?hour group therapy session (Check ins, triggers, and team building activity)     Clinical Maneuvering/Interventions:?Therapist utilized a person-centered, Motivation interviewing, and CBT approaches to build rapport, exploring and resolving ambivalence associated with commitment to change behaviors related to substance use with and addiction, group member.?Therapist implemented motivational interviewing techniques to assist client with, facilitate identification of maladaptive patterns of thinking and behavior that contribute to client's risk for continued substance use and relapse.?Therapist employed group interaction activities to build rapport among group members, promote sobriety, and emphasize relapse prevention.?Therapist promoted a safe nonjudgmental environment by providing group members with unconditional positive regard and encouraging group members to comply with group rules and guidelines. Therapist assisted group members with identifying and implementing healthier coping strategies.        Response:?Patient attended class in person. Patient participated in completion of check in form. Patient on check in form answered no to question \"currently or since last group meeting,?have you had any suicidal thoughts or plan or intent to hurt yourself”, and patient also answered no to question of \"currently or since your last group meeting, have you had any homicidal thoughts or plan or intent to hurt others\".      On a 1:10 Scale (0-none, 10-high):    Anxiety: 0  Depression: 0  Cravings: 0    Assessment     Diagnoses and all orders for this visit:    1. Moderate methamphetamine use disorder (Primary)    2. Moderate alcohol use disorder    3. Moderate cannabis use disorder             Progress toward goal: At goal    Functional Status: Moderate impairment "     Prognosis: Good with Ongoing Treatment     Mental Status Exam:   Hygiene:   good  Cooperation:  Cooperative  Eye Contact:  Good  Psychomotor Behavior:  Appropriate  Affect:  Appropriate  Mood: anxious  Speech:  Normal  Thought Process:  Linear  Thought Content:  Mood congruent  Suicidal:  None  Homicidal:  None  Hallucinations:  None  Delusion:  None  Memory:  Intact  Orientation:  Person, Place, Time, and Situation  Reliability:  fair  Insight:  fair  Judgement:  fair  Impulse Control:  fair  Physical/Medical Issues:  No      Assessment:  Engaged in activity/Process and self-disclosed: Yes  Affect:Appropriate   Applies topic to self: Yes  Able to give and receive feedback: Yes    Urinalysis: Negative on preliminary result dated 12/26/2024  Labs:   Last Urine Toxicity  More data exists         Latest Ref Rng & Units 12/16/2024 12/12/2024   LAST URINE TOXICITY RESULTS   Amphetamine, Urine Qual Negative Negative  Negative    Barbiturates Screen, Urine Negative Negative  Negative    Benzodiazepine Screen, Urine Negative Negative  Negative    Buprenorphine, Screen, Urine Negative Negative  Negative    Cocaine Screen, Urine Negative Negative  Negative    Methadone Screen , Urine Negative Negative  Negative    Methamphetamine, Ur Negative Negative  Negative       Details                    Self-Reported days since last use: Patient check-in sheet reports 24 days    12-Step attendance: Patient check-in sheet reports zero 12 step groups    Plan:   Patient will continue in weekly group psychotherapy sessions and individual outpatient psychotherapy sessions every 3-4 weeks and will continue in self-help meetings and seek additional treatment if necessary following completion.    Patient will continue in IOP Phase two and three group.      Safety plan has previously been discussed with patient.     TUCKER Rider  [unfilled]  17:47 EST

## 2024-12-30 ENCOUNTER — OFFICE VISIT (OUTPATIENT)
Dept: PSYCHIATRY | Facility: CLINIC | Age: 54
End: 2024-12-30
Payer: COMMERCIAL

## 2024-12-30 DIAGNOSIS — F15.20 MODERATE METHAMPHETAMINE USE DISORDER: Primary | ICD-10-CM

## 2024-12-30 DIAGNOSIS — F12.20 MODERATE CANNABIS USE DISORDER: ICD-10-CM

## 2024-12-30 DIAGNOSIS — F10.20 MODERATE ALCOHOL USE DISORDER: ICD-10-CM

## 2024-12-30 PROCEDURE — 90853 GROUP PSYCHOTHERAPY: CPT | Performed by: COUNSELOR

## 2024-12-30 NOTE — PROGRESS NOTES
"[facility] PHASE II / Phase III GROUP NOTE    Name: Pieter William  Date: December 30, 2024    Time in:?3:30   Time out:?4:30   Participants: 09     Data: 1?hour group therapy session (Check ins, just for today reading, and goal activity)      Clinical Maneuvering/Interventions:?Therapist utilized a person-centered, Motivation interviewing, and CBT approaches to build rapport, exploring and resolving ambivalence associated with commitment to change behaviors related to substance use with and addiction, group member.?Therapist implemented motivational interviewing techniques to assist client with, facilitate identification of maladaptive patterns of thinking and behavior that contribute to client's risk for continued substance use and relapse.?Therapist employed group interaction activities to build rapport among group members, promote sobriety, and emphasize relapse prevention.?Therapist promoted a safe nonjudgmental environment by providing group members with unconditional positive regard and encouraging group members to comply with group rules and guidelines. Therapist assisted group members with identifying and implementing healthier coping strategies.        Response:?Patient attended class in person. Patient participated in completion of check in form. Patient on check in form answered no to question \"currently or since last group meeting,?have you had any suicidal thoughts or plan or intent to hurt yourself”, and patient also answered no to question of \"currently or since your last group meeting, have you had any homicidal thoughts or plan or intent to hurt others\".  Patient identified goals that he is actively working towards and identified a step to help him start his goals.    On a 1:10 Scale (0-none, 10-high):    Anxiety: 0  Depression: 0  Cravings: 0    Assessment     Diagnoses and all orders for this visit:    1. Moderate methamphetamine use disorder (Primary)    2. Moderate alcohol use disorder    3. " Moderate cannabis use disorder             Progress toward goal: At goal    Functional Status: Moderate impairment     Prognosis: Good with Ongoing Treatment     Mental Status Exam:   Hygiene:   good  Cooperation:  Cooperative  Eye Contact:  Good  Psychomotor Behavior:  Appropriate  Affect:  Appropriate  Mood: normal  Speech:  Normal  Thought Process:  Linear  Thought Content:  Mood congruent  Suicidal:  None  Homicidal:  None  Hallucinations:  None  Delusion:  None  Memory:  Intact  Orientation:  Person, Place, Time, and Situation  Reliability:  fair  Insight:  fair  Judgement:  fair  Impulse Control:  fair  Physical/Medical Issues:  No      Assessment:  Engaged in activity/Process and self-disclosed: Yes  Affect:Appropriate   Applies topic to self: Yes  Able to give and receive feedback: Yes    Urinalysis: Negative on preliminary result dated 12/26/2024  Labs:   Last Urine Toxicity  More data exists         Latest Ref Rng & Units 12/26/2024 12/16/2024   LAST URINE TOXICITY RESULTS   Amphetamine, Urine Qual Negative Negative  Negative    Barbiturates Screen, Urine Negative Negative  Negative    Benzodiazepine Screen, Urine Negative Negative  Negative    Buprenorphine, Screen, Urine Negative Negative  Negative    Cocaine Screen, Urine Negative Negative  Negative    Methadone Screen , Urine Negative Negative  Negative    Methamphetamine, Ur Negative Negative  Negative       Details                    Self-Reported days since last use: Patient check-in sheet reports 28 days    12-Step attendance: Patient check-in sheet reports zero 12-step meetings    Plan:   Patient will continue in weekly group psychotherapy sessions and individual outpatient psychotherapy sessions every 3-4 weeks and will continue in self-help meetings and seek additional treatment if necessary following completion.    Patient will continue in IOP Phase two and three group.      Safety plan has previously been discussed with patient.     Leno  TUCKER Pereyra  [unfilled]  17:38 EST

## 2025-01-02 ENCOUNTER — OFFICE VISIT (OUTPATIENT)
Dept: PSYCHIATRY | Facility: CLINIC | Age: 55
End: 2025-01-02
Payer: COMMERCIAL

## 2025-01-02 ENCOUNTER — LAB (OUTPATIENT)
Dept: LAB | Facility: HOSPITAL | Age: 55
End: 2025-01-02
Payer: COMMERCIAL

## 2025-01-02 DIAGNOSIS — F12.20 CANNABIS USE DISORDER, MODERATE, DEPENDENCE: ICD-10-CM

## 2025-01-02 DIAGNOSIS — F10.20 ALCOHOL USE DISORDER, MODERATE, DEPENDENCE: ICD-10-CM

## 2025-01-02 DIAGNOSIS — F15.20 MODERATE METHAMPHETAMINE USE DISORDER: Primary | ICD-10-CM

## 2025-01-02 DIAGNOSIS — F10.20 MODERATE ALCOHOL USE DISORDER: ICD-10-CM

## 2025-01-02 DIAGNOSIS — F12.20 MODERATE CANNABIS USE DISORDER: ICD-10-CM

## 2025-01-02 DIAGNOSIS — F15.20 METHAMPHETAMINE USE DISORDER, MODERATE: ICD-10-CM

## 2025-01-02 PROCEDURE — 90853 GROUP PSYCHOTHERAPY: CPT | Performed by: COUNSELOR

## 2025-01-02 PROCEDURE — 80306 DRUG TEST PRSMV INSTRMNT: CPT

## 2025-01-02 NOTE — PROGRESS NOTES
"[facility] PHASE II / Phase III GROUP NOTE    Name: Pieter William  Date: January 2, 2025    Time in:?3:30   Time out:?4:30   Participants: 10     Data: 1?hour group therapy session (Check ins, and self-sabotage)     Clinical Maneuvering/Interventions:?Therapist utilized a person-centered, Motivation interviewing, and CBT approaches to build rapport, exploring and resolving ambivalence associated with commitment to change behaviors related to substance use with and addiction, group member.?Therapist implemented motivational interviewing techniques to assist client with, facilitate identification of maladaptive patterns of thinking and behavior that contribute to client's risk for continued substance use and relapse.?Therapist employed group interaction activities to build rapport among group members, promote sobriety, and emphasize relapse prevention.?Therapist promoted a safe nonjudgmental environment by providing group members with unconditional positive regard and encouraging group members to comply with group rules and guidelines. Therapist assisted group members with identifying and implementing healthier coping strategies.        Response:?Patient attended class in person. Patient participated in completion of check in form. Patient on check in form answered no to question \"currently or since last group meeting,?have you had any suicidal thoughts or plan or intent to hurt yourself”, and patient also answered no to question of \"currently or since your last group meeting, have you had any homicidal thoughts or plan or intent to hurt others\". Patient participated fully in group discussion by identifying different types of examples of self-sabotage.     On a 1:10 Scale (0-none, 10-high):    Anxiety: 0  Depression: 0  Cravings: 0    Assessment     Diagnoses and all orders for this visit:    1. Moderate methamphetamine use disorder (Primary)    2. Moderate alcohol use disorder    3. Moderate cannabis use " disorder             Progress toward goal: At goal    Functional Status: Moderate impairment     Prognosis: Good with Ongoing Treatment     Mental Status Exam:   Hygiene:   good  Cooperation:  Cooperative  Eye Contact:  Good  Psychomotor Behavior:  Appropriate  Affect:  Appropriate  Mood: normal  Speech:  Normal  Thought Process:  Linear  Thought Content:  Normal  Suicidal:  None  Homicidal:  None  Hallucinations:  None  Delusion:  None  Memory:  Intact  Orientation:  Person, Place, Time, and Situation  Reliability:  fair  Insight:  fair  Judgement:  fair  Impulse Control:  fair  Physical/Medical Issues:  No      Assessment:  Engaged in activity/Process and self-disclosed: Yes  Affect:Appropriate   Applies topic to self: Yes  Able to give and receive feedback: Yes    Urinalysis: Positive amphetamine on preliminary result dated 01/02/2025  Labs:   Last Urine Toxicity  More data exists         Latest Ref Rng & Units 12/26/2024 12/16/2024   LAST URINE TOXICITY RESULTS   Amphetamine, Urine Qual Negative Negative  Negative    Barbiturates Screen, Urine Negative Negative  Negative    Benzodiazepine Screen, Urine Negative Negative  Negative    Buprenorphine, Screen, Urine Negative Negative  Negative    Cocaine Screen, Urine Negative Negative  Negative    Methadone Screen , Urine Negative Negative  Negative    Methamphetamine, Ur Negative Negative  Negative       Details                    Self-Reported days since last use: Patient check-in sheet reports 32 days    12-Step attendance: Patient check-in sheet reports zero 12-step meetings    Plan:   Patient will continue in weekly group psychotherapy sessions and individual outpatient psychotherapy sessions every 3-4 weeks and will continue in self-help meetings and seek additional treatment if necessary following completion.    Patient will continue in IOP Phase two and three group.      Safety plan has previously been discussed with patient.     Leno Pereyra,  TUCKER  [unfilled]  18:02 EST

## 2025-01-03 LAB — REF LAB TEST METHOD: NORMAL

## 2025-01-09 ENCOUNTER — LAB (OUTPATIENT)
Dept: LAB | Facility: HOSPITAL | Age: 55
End: 2025-01-09
Payer: COMMERCIAL

## 2025-01-09 ENCOUNTER — OFFICE VISIT (OUTPATIENT)
Dept: PSYCHIATRY | Facility: CLINIC | Age: 55
End: 2025-01-09
Payer: COMMERCIAL

## 2025-01-09 DIAGNOSIS — F12.20 MODERATE CANNABIS USE DISORDER: ICD-10-CM

## 2025-01-09 DIAGNOSIS — F10.20 MODERATE ALCOHOL USE DISORDER: ICD-10-CM

## 2025-01-09 DIAGNOSIS — F15.20 MODERATE METHAMPHETAMINE USE DISORDER: Primary | ICD-10-CM

## 2025-01-09 DIAGNOSIS — F10.20 ALCOHOL USE DISORDER, MODERATE, DEPENDENCE: ICD-10-CM

## 2025-01-09 DIAGNOSIS — F12.20 CANNABIS USE DISORDER, MODERATE, DEPENDENCE: ICD-10-CM

## 2025-01-09 DIAGNOSIS — F15.20 METHAMPHETAMINE USE DISORDER, MODERATE: ICD-10-CM

## 2025-01-09 PROCEDURE — 80306 DRUG TEST PRSMV INSTRMNT: CPT

## 2025-01-09 PROCEDURE — 90853 GROUP PSYCHOTHERAPY: CPT | Performed by: COUNSELOR

## 2025-01-09 NOTE — PROGRESS NOTES
"[facility] PHASE II / Phase III GROUP NOTE    Name: Pieter William  Date: January 9, 2025    Time in:?3:30   Time out:?4:30   Participants: 8     Data: 1?hour group therapy session (Check ins, and self-sabotage)     Clinical Maneuvering/Interventions:?Therapist utilized a person-centered, Motivation interviewing, and CBT approaches to build rapport, exploring and resolving ambivalence associated with commitment to change behaviors related to substance use with and addiction, group member.?Therapist implemented motivational interviewing techniques to assist client with, facilitate identification of maladaptive patterns of thinking and behavior that contribute to client's risk for continued substance use and relapse.?Therapist employed group interaction activities to build rapport among group members, promote sobriety, and emphasize relapse prevention.?Therapist promoted a safe nonjudgmental environment by providing group members with unconditional positive regard and encouraging group members to comply with group rules and guidelines. Therapist assisted group members with identifying and implementing healthier coping strategies.        Response:?Patient attended class in person. Patient participated in completion of check in form. Patient on check in form answered no to question \"currently or since last group meeting,?have you had any suicidal thoughts or plan or intent to hurt yourself”, and patient also answered no to question of \"currently or since your last group meeting, have you had any homicidal thoughts or plan or intent to hurt others\". Patient participated fully in group discussion by identifying different reasons for self-sabotage.     On a 1:10 Scale (0-none, 10-high):    Anxiety: 0  Depression: 0  Cravings: 0    Assessment     Diagnoses and all orders for this visit:    1. Moderate methamphetamine use disorder (Primary)    2. Moderate alcohol use disorder    3. Moderate cannabis use disorder         "     Progress toward goal: At goal    Functional Status: Moderate impairment     Prognosis: Good with Ongoing Treatment     Mental Status Exam:   Hygiene:   good  Cooperation:  Cooperative  Eye Contact:  Good  Psychomotor Behavior:  Appropriate  Affect:  Appropriate  Mood: normal  Speech:  Normal  Thought Process:  Linear  Thought Content:  Mood congruent  Suicidal:  None  Homicidal:  None  Hallucinations:  None  Delusion:  None  Memory:  Intact  Orientation:  Person, Place, Time, and Situation  Reliability:  fair  Insight:  fair  Judgement:  fair  Impulse Control:  fair  Physical/Medical Issues:  No      Assessment:  Engaged in activity/Process and self-disclosed: Yes  Affect:Appropriate   Applies topic to self: Yes  Able to give and receive feedback: Yes    Urinalysis: Negative on preliminary result dated 1/9/2025  Labs:   Last Urine Toxicity  More data exists         Latest Ref Rng & Units 1/2/2025 12/26/2024   LAST URINE TOXICITY RESULTS   Amphetamine, Urine Qual Negative Positive  Negative    Barbiturates Screen, Urine Negative Negative  Negative    Benzodiazepine Screen, Urine Negative Negative  Negative    Buprenorphine, Screen, Urine Negative Negative  Negative    Cocaine Screen, Urine Negative Negative  Negative    Methadone Screen , Urine Negative Negative  Negative    Methamphetamine, Ur Negative Negative  Negative       Details                    Self-Reported days since last use: Patient check-in sheet reports 39 days    12-Step attendance: Patient check-in sheet reports zero 12-step meetings    Plan:   Patient will continue in weekly group psychotherapy sessions and individual outpatient psychotherapy sessions every 3-4 weeks and will continue in self-help meetings and seek additional treatment if necessary following completion.    Patient will continue in IOP Phase two and three group.      Safety plan has previously been discussed with patient.     TUCKER Rider  [unfilled]  17:50 EST

## 2025-01-13 ENCOUNTER — TELEPHONE (OUTPATIENT)
Dept: PSYCHIATRY | Facility: CLINIC | Age: 55
End: 2025-01-13
Payer: COMMERCIAL

## 2025-01-13 ENCOUNTER — OFFICE VISIT (OUTPATIENT)
Dept: PSYCHIATRY | Facility: CLINIC | Age: 55
End: 2025-01-13
Payer: COMMERCIAL

## 2025-01-13 DIAGNOSIS — F10.20 MODERATE ALCOHOL USE DISORDER: ICD-10-CM

## 2025-01-13 DIAGNOSIS — F12.20 MODERATE CANNABIS USE DISORDER: ICD-10-CM

## 2025-01-13 DIAGNOSIS — F15.20 MODERATE METHAMPHETAMINE USE DISORDER: Primary | ICD-10-CM

## 2025-01-13 LAB — REF LAB TEST METHOD: NORMAL

## 2025-01-13 PROCEDURE — 90853 GROUP PSYCHOTHERAPY: CPT | Performed by: COUNSELOR

## 2025-01-14 NOTE — PROGRESS NOTES
"IOP PHASE II / Phase III GROUP NOTE    Name: Pieter William  Date: January 13, 2025    Time in:?3:30   Time out:?4:30   Participants: 8     Data: 1?hour group therapy session (Check ins, and self-sabotage)     Clinical Maneuvering/Interventions:?Therapist utilized a person-centered, Motivation interviewing, and CBT approaches to build rapport, exploring and resolving ambivalence associated with commitment to change behaviors related to substance use with and addiction, group member.?Therapist implemented motivational interviewing techniques to assist client with, facilitate identification of maladaptive patterns of thinking and behavior that contribute to client's risk for continued substance use and relapse.?Therapist employed group interaction activities to build rapport among group members, promote sobriety, and emphasize relapse prevention.?Therapist promoted a safe nonjudgmental environment by providing group members with unconditional positive regard and encouraging group members to comply with group rules and guidelines. Therapist assisted group members with identifying and implementing healthier coping strategies.        Response:?Patient attended class in person. Patient participated in completion of check in form. Patient on check in form answered no to question \"currently or since last group meeting,?have you had any suicidal thoughts or plan or intent to hurt yourself”, and patient also answered no to question of \"currently or since your last group meeting, have you had any homicidal thoughts or plan or intent to hurt others\". Patient identified different behaviors of self-sabotage. Patient reviewed the rules of CD-IOP.  Patient agreed to complete a pros and cons list and a letter explaining why he should stay in the program due to a failed UDS on 1/02/2025.    On a 1:10 Scale (0-none, 10-high):    Anxiety: 0  Depression: 0  Cravings: 0    Assessment     Diagnoses and all orders for this " visit:    1. Moderate methamphetamine use disorder (Primary)    2. Moderate alcohol use disorder    3. Moderate cannabis use disorder             Progress toward goal: At goal    Functional Status: Moderate impairment     Prognosis: Good with Ongoing Treatment     Mental Status Exam:   Hygiene:   good  Cooperation:  Cooperative  Eye Contact:  Good  Psychomotor Behavior:  Appropriate  Affect:  Appropriate  Mood: normal  Speech:  Normal  Thought Process:  Linear  Thought Content:  Mood congruent  Suicidal:  None  Homicidal:  None  Hallucinations:  None  Delusion:  None  Memory:  Intact  Orientation:  Person, Place, Time, and Situation  Reliability:  fair  Insight:  fair  Judgement:  fair  Impulse Control:  fair  Physical/Medical Issues:  No      Assessment:  Engaged in activity/Process and self-disclosed: Yes  Affect:Appropriate   Applies topic to self: Yes  Able to give and receive feedback: Yes    Urinalysis: Negative on preliminary result dated 01/09/2025  Labs:   Last Urine Toxicity  More data exists         Latest Ref Rng & Units 1/9/2025 1/2/2025   LAST URINE TOXICITY RESULTS   Amphetamine, Urine Qual Negative Negative  Positive    Barbiturates Screen, Urine Negative Negative  Negative    Benzodiazepine Screen, Urine Negative Negative  Negative    Buprenorphine, Screen, Urine Negative Negative  Negative    Cocaine Screen, Urine Negative Negative  Negative    Methadone Screen , Urine Negative Negative  Negative    Methamphetamine, Ur Negative Negative  Negative       Details                    Self-Reported days since last use: Patient check-in sheet reports 43 days    12-Step attendance: Patient check-in sheet reports zero 12-step meetings    Plan:   Patient will continue in weekly group psychotherapy sessions and individual outpatient psychotherapy sessions every 3-4 weeks and will continue in self-help meetings and seek additional treatment if necessary following completion.    Patient will continue in IOP  Phase two and three group.      Safety plan has previously been discussed with patient.     TUCKER Rider  [unfilled]  07:21 EST

## 2025-01-15 LAB — REF LAB TEST METHOD: NORMAL

## 2025-01-16 ENCOUNTER — LAB (OUTPATIENT)
Dept: LAB | Facility: HOSPITAL | Age: 55
End: 2025-01-16
Payer: COMMERCIAL

## 2025-01-16 ENCOUNTER — OFFICE VISIT (OUTPATIENT)
Dept: PSYCHIATRY | Facility: CLINIC | Age: 55
End: 2025-01-16
Payer: COMMERCIAL

## 2025-01-16 DIAGNOSIS — F15.20 MODERATE METHAMPHETAMINE USE DISORDER: Primary | ICD-10-CM

## 2025-01-16 DIAGNOSIS — F10.20 ALCOHOL USE DISORDER, MODERATE, DEPENDENCE: ICD-10-CM

## 2025-01-16 DIAGNOSIS — F10.20 MODERATE ALCOHOL USE DISORDER: ICD-10-CM

## 2025-01-16 DIAGNOSIS — F15.20 METHAMPHETAMINE USE DISORDER, MODERATE: ICD-10-CM

## 2025-01-16 DIAGNOSIS — F12.20 MODERATE CANNABIS USE DISORDER: ICD-10-CM

## 2025-01-16 DIAGNOSIS — F12.20 CANNABIS USE DISORDER, MODERATE, DEPENDENCE: ICD-10-CM

## 2025-01-16 PROCEDURE — 80306 DRUG TEST PRSMV INSTRMNT: CPT

## 2025-01-16 PROCEDURE — 90853 GROUP PSYCHOTHERAPY: CPT | Performed by: COUNSELOR

## 2025-01-16 NOTE — PROGRESS NOTES
"[facility] PHASE II / Phase III GROUP NOTE    Name: Pieter William  Date: January 16, 2025    Time in:?3:30   Time out:?4:30   Participants: 08     Data: 1?hour group therapy session (Check ins, and self-sabotage)     Clinical Maneuvering/Interventions:?Therapist utilized a person-centered, Motivation interviewing, and CBT approaches to build rapport, exploring and resolving ambivalence associated with commitment to change behaviors related to substance use with and addiction, group member.?Therapist implemented motivational interviewing techniques to assist client with, facilitate identification of maladaptive patterns of thinking and behavior that contribute to client's risk for continued substance use and relapse.?Therapist employed group interaction activities to build rapport among group members, promote sobriety, and emphasize relapse prevention.?Therapist promoted a safe nonjudgmental environment by providing group members with unconditional positive regard and encouraging group members to comply with group rules and guidelines. Therapist assisted group members with identifying and implementing healthier coping strategies.        Response:?Patient attended class in person. Patient participated in completion of check in form. Patient on check in form answered no to question \"currently or since last group meeting,?have you had any suicidal thoughts or plan or intent to hurt yourself”, and patient also answered no to question of \"currently or since your last group meeting, have you had any homicidal thoughts or plan or intent to hurt others\". Patient identified different behaviors of self-sabotage and how to over come self-sabotage.    On a 1:10 Scale (0-none, 10-high):    Anxiety: 0  Depression: 0  Cravings: 0    Assessment     Diagnoses and all orders for this visit:    1. Moderate methamphetamine use disorder (Primary)    2. Moderate alcohol use disorder    3. Moderate cannabis use disorder         "     Progress toward goal: At goal    Functional Status: Moderate impairment     Prognosis: Good with Ongoing Treatment     Mental Status Exam:   Hygiene:   good  Cooperation:  Cooperative  Eye Contact:  Good  Psychomotor Behavior:  Appropriate  Affect:  Appropriate  Mood: normal  Speech:  Normal  Thought Process:  Linear  Thought Content:  Normal  Suicidal:  None  Homicidal:  None  Hallucinations:  None  Delusion:  None  Memory:  Intact  Orientation:  Person, Place, Time, and Situation  Reliability:  fair  Insight:  fair  Judgement:  fair  Impulse Control:  fair  Physical/Medical Issues:  No      Assessment:  Engaged in activity/Process and self-disclosed: Yes  Affect:Appropriate   Applies topic to self: Yes  Able to give and receive feedback: Yes    Urinalysis: Negative on preliminary result dated 1/16/2025  Labs:   Last Urine Toxicity  More data exists         Latest Ref Rng & Units 1/9/2025 1/2/2025   LAST URINE TOXICITY RESULTS   Amphetamine, Urine Qual Negative Negative  Positive    Barbiturates Screen, Urine Negative Negative  Negative    Benzodiazepine Screen, Urine Negative Negative  Negative    Buprenorphine, Screen, Urine Negative Negative  Negative    Cocaine Screen, Urine Negative Negative  Negative    Methadone Screen , Urine Negative Negative  Negative    Methamphetamine, Ur Negative Negative  Negative       Details                    Self-Reported days since last use: Patient check-in sheet reports 46 days    12-Step attendance: Patient check-in sheet reports zero 12-step meeting    Plan:   Patient will continue in weekly group psychotherapy sessions and individual outpatient psychotherapy sessions every 3-4 weeks and will continue in self-help meetings and seek additional treatment if necessary following completion.    Patient will continue in IOP Phase two and three group.      Safety plan has previously been discussed with patient.     TUCKER Rider  [unfilled]  15:43 EST   none

## 2025-01-20 ENCOUNTER — OFFICE VISIT (OUTPATIENT)
Dept: PSYCHIATRY | Facility: CLINIC | Age: 55
End: 2025-01-20
Payer: COMMERCIAL

## 2025-01-20 ENCOUNTER — LAB (OUTPATIENT)
Dept: LAB | Facility: HOSPITAL | Age: 55
End: 2025-01-20
Payer: COMMERCIAL

## 2025-01-20 DIAGNOSIS — F10.20 MODERATE ALCOHOL USE DISORDER: ICD-10-CM

## 2025-01-20 DIAGNOSIS — F15.20 METHAMPHETAMINE USE DISORDER, MODERATE: ICD-10-CM

## 2025-01-20 DIAGNOSIS — F12.20 MODERATE CANNABIS USE DISORDER: ICD-10-CM

## 2025-01-20 DIAGNOSIS — F12.20 CANNABIS USE DISORDER, MODERATE, DEPENDENCE: ICD-10-CM

## 2025-01-20 DIAGNOSIS — F15.20 MODERATE METHAMPHETAMINE USE DISORDER: Primary | ICD-10-CM

## 2025-01-20 DIAGNOSIS — F10.20 ALCOHOL USE DISORDER, MODERATE, DEPENDENCE: ICD-10-CM

## 2025-01-20 PROCEDURE — 80306 DRUG TEST PRSMV INSTRMNT: CPT

## 2025-01-20 PROCEDURE — 90853 GROUP PSYCHOTHERAPY: CPT | Performed by: COUNSELOR

## 2025-01-20 NOTE — PROGRESS NOTES
"[facility] PHASE II / Phase III GROUP NOTE    Name: Pieter William  Date: January 20, 2025    Time in:?3:30   Time out:?4:30   Participants: 06     Data: 1?hour group therapy session (Check ins, and will power in recovery)     Clinical Maneuvering/Interventions:?Therapist utilized a person-centered, Motivation interviewing, and CBT approaches to build rapport, exploring and resolving ambivalence associated with commitment to change behaviors related to substance use with and addiction, group member.?Therapist implemented motivational interviewing techniques to assist client with, facilitate identification of maladaptive patterns of thinking and behavior that contribute to client's risk for continued substance use and relapse.?Therapist employed group interaction activities to build rapport among group members, promote sobriety, and emphasize relapse prevention.?Therapist promoted a safe nonjudgmental environment by providing group members with unconditional positive regard and encouraging group members to comply with group rules and guidelines. Therapist assisted group members with identifying and implementing healthier coping strategies.    ?     Response:?Patient attended class in person. Patient participated in completion of check in form. Patient on check in form answered no to question \"currently or since last group meeting,?have you had any suicidal thoughts or plan or intent to hurt yourself”, and patient also answered no to question of \"currently or since your last group meeting, have you had any homicidal thoughts or plan or intent to hurt others\". Patients identified different types of will power and how choices affect their sobriety.     On a 1:10 Scale (0-none, 10-high):    Anxiety: 0  Depression: 0  Cravings: 0    Assessment     Diagnoses and all orders for this visit:    1. Moderate methamphetamine use disorder (Primary)    2. Moderate alcohol use disorder    3. Moderate cannabis use disorder         "     Progress toward goal: At goal    Functional Status: Moderate impairment     Prognosis: Good with Ongoing Treatment     Mental Status Exam:   Hygiene:   good  Cooperation:  Cooperative  Eye Contact:  Good  Psychomotor Behavior:  Appropriate  Affect:  Appropriate  Mood: normal  Speech:  Normal  Thought Process:  Linear  Thought Content:  Mood congruent  Suicidal:  None  Homicidal:  None  Hallucinations:  None  Delusion:  None  Memory:  Intact  Orientation:  Person, Place, Time, and Situation  Reliability:  fair  Insight:  fair  Judgement:  fair  Impulse Control:  fair  Physical/Medical Issues:  No      Assessment:  Engaged in activity/Process and self-disclosed: Yes  Affect:Appropriate   Applies topic to self: Yes  Able to give and receive feedback: Yes    Urinalysis: Negative on preliminary result dated 1/20/2025  Labs:   Last Urine Toxicity  More data exists         Latest Ref Rng & Units 1/16/2025 1/9/2025   LAST URINE TOXICITY RESULTS   Amphetamine, Urine Qual Negative Negative  Negative    Barbiturates Screen, Urine Negative Negative  Negative    Benzodiazepine Screen, Urine Negative Negative  Negative    Buprenorphine, Screen, Urine Negative Negative  Negative    Cocaine Screen, Urine Negative Negative  Negative    Methadone Screen , Urine Negative Negative  Negative    Methamphetamine, Ur Negative Negative  Negative       Details                    Self-Reported days since last use: Patient check-in sheet reports 50 days    12-Step attendance: Patient check-in sheet reports zero 12-step meetings    Plan:   Patient will continue in weekly group psychotherapy sessions and individual outpatient psychotherapy sessions every 3-4 weeks and will continue in self-help meetings and seek additional treatment if necessary following completion.    Patient will continue in IOP Phase two and three group.      Safety plan has previously been discussed with patient.     TUCKER Rider  [unfilled]  15:42 EST

## 2025-01-21 LAB — REF LAB TEST METHOD: NORMAL

## 2025-01-23 ENCOUNTER — OFFICE VISIT (OUTPATIENT)
Dept: PSYCHIATRY | Facility: CLINIC | Age: 55
End: 2025-01-23
Payer: COMMERCIAL

## 2025-01-23 DIAGNOSIS — F15.20 MODERATE METHAMPHETAMINE USE DISORDER: Primary | ICD-10-CM

## 2025-01-27 LAB — REF LAB TEST METHOD: NORMAL

## 2025-01-30 ENCOUNTER — LAB (OUTPATIENT)
Dept: LAB | Facility: HOSPITAL | Age: 55
End: 2025-01-30
Payer: COMMERCIAL

## 2025-01-30 DIAGNOSIS — F15.20 METHAMPHETAMINE USE DISORDER, MODERATE: ICD-10-CM

## 2025-01-30 DIAGNOSIS — F10.20 ALCOHOL USE DISORDER, MODERATE, DEPENDENCE: ICD-10-CM

## 2025-01-30 DIAGNOSIS — F12.20 CANNABIS USE DISORDER, MODERATE, DEPENDENCE: ICD-10-CM

## 2025-01-30 PROCEDURE — 80306 DRUG TEST PRSMV INSTRMNT: CPT

## 2025-01-30 NOTE — PROGRESS NOTES
[facility] IOP GROUP     Date: 01/23/2025  Name: Pieter William    Time In: 1530   Time Out: 1630     Number of participants: 7    IOP GROUP NOTE     Services Provided  Group Psychotherapy: 1 hour IOP group therapy session (Check-ins, Coping Skills, Relapse Prevention)  Education and Training:  Activity Therapy:  Individual Therapy:  Family Therapy:  MD Initial:  MD Follow-Up     Check Ins: Therapist continued facilitation of rapport building strategies between group members. Therapist asked that each patient check in with home life and recovery efforts and identify triggers, cravings, and high risk situations that arise between group sessions. Therapist provided empathy and support during group session.     Session Content/Coping Skills: MIGUELINA Recinos student facilitated session under supervision of Fabián Martinez LCSW. Grisel facilitated review of Motivation and Determination psychoeducational material and discussed intrinsic and extrinsic motivation. Setting SMART goals discussed with group.     Response: Patient attended class in person. Patient participated in completion of check in form. Patient on check in form answered no to question of “currently or since your last group meeting, have you had any suicidal thoughts or plan or intent to hurt yourself”, and patient also answered no on check in form to question of “currently or since your last group meeting, have you had any homicidal thoughts or plan or intent to hurt others”.     Personal Assessment 0-10 Scale (0-none, 10-high)    Anxiety:  0   Depression:  0   Cravings: 0     Assessment:     ..  Lab on 01/20/2025   Component Date Value Ref Range Status    THC, Screen, Urine 01/20/2025 Negative  Negative Final    Phencyclidine (PCP), Urine 01/20/2025 Negative  Negative Final    Cocaine Screen, Urine 01/20/2025 Negative  Negative Final    Methamphetamine, Ur 01/20/2025 Negative  Negative Final    Opiate Screen 01/20/2025 Negative  Negative Final    Amphetamine  Screen, Urine 01/20/2025 Negative  Negative Final    Benzodiazepine Screen, Urine 01/20/2025 Negative  Negative Final    Tricyclic Antidepressants Screen 01/20/2025 Negative  Negative Final    Methadone Screen, Urine 01/20/2025 Negative  Negative Final    Barbiturates Screen, Urine 01/20/2025 Negative  Negative Final    Oxycodone Screen, Urine 01/20/2025 Negative  Negative Final    Buprenorphine, Screen, Urine 01/20/2025 Negative  Negative Final   Lab on 01/16/2025   Component Date Value Ref Range Status    THC, Screen, Urine 01/16/2025 Negative  Negative Final    Phencyclidine (PCP), Urine 01/16/2025 Negative  Negative Final    Cocaine Screen, Urine 01/16/2025 Negative  Negative Final    Methamphetamine, Ur 01/16/2025 Negative  Negative Final    Opiate Screen 01/16/2025 Negative  Negative Final    Amphetamine Screen, Urine 01/16/2025 Negative  Negative Final    Benzodiazepine Screen, Urine 01/16/2025 Negative  Negative Final    Tricyclic Antidepressants Screen 01/16/2025 Negative  Negative Final    Methadone Screen, Urine 01/16/2025 Negative  Negative Final    Barbiturates Screen, Urine 01/16/2025 Negative  Negative Final    Oxycodone Screen, Urine 01/16/2025 Negative  Negative Final    Buprenorphine, Screen, Urine 01/16/2025 Negative  Negative Final   Lab on 01/09/2025   Component Date Value Ref Range Status    THC, Screen, Urine 01/09/2025 Negative  Negative Final    Phencyclidine (PCP), Urine 01/09/2025 Negative  Negative Final    Cocaine Screen, Urine 01/09/2025 Negative  Negative Final    Methamphetamine, Ur 01/09/2025 Negative  Negative Final    Opiate Screen 01/09/2025 Negative  Negative Final    Amphetamine Screen, Urine 01/09/2025 Negative  Negative Final    Benzodiazepine Screen, Urine 01/09/2025 Negative  Negative Final    Tricyclic Antidepressants Screen 01/09/2025 Negative  Negative Final    Methadone Screen, Urine 01/09/2025 Negative  Negative Final    Barbiturates Screen, Urine 01/09/2025 Negative   Negative Final    Oxycodone Screen, Urine 01/09/2025 Negative  Negative Final    Buprenorphine, Screen, Urine 01/09/2025 Negative  Negative Final       Mental Status Exam  Hygiene:  good  Dress: casual  Attitude: cooperative and agreeable   Motor Activity: appropriate  Eye Contact:  good  Speech: regular rate and rhythm   Mood:  calm and cooperative  Affect:  Appropriate  Thought Processes:  Linear  Thought Content:  Normal  Suicidal Thoughts:  denies  Homicidal Thoughts:  denies  Crisis Safety Plan: Safety plan has been discussed.   Hallucinations:  Unknown to clinician.   Reliability: fair  Insight: fair  Judgement: fair  Impulse Control: fair    Recovery/spiritual support group attendance: No.      Progress toward goal: Not at goal    Prognosis: Fair with Ongoing Treatment     Self-reported number of days sober: Patient on check in form reported 52.    Patient will contact this office, call 911 or present to the nearest emergency room should suicidal or homicidal ideations occur.    Impression/Formulation:    ICD-10-CM ICD-9-CM   1. Moderate methamphetamine use disorder  F15.20 304.40       Clinical Maneuvering/Interventions: Therapist utilized a person-centered approach to build rapport with group member. Therapist implemented motivational interviewing techniques to assist client with exploring and resolving ambivalence associated with commitment to change behaviors related to substance use and addiction. Therapist applied cognitive behavioral strategies to facilitate identification of maladaptive patterns of thinking and behavior that contribute to client's risk for continued substance use and relapse. Therapist employed group interaction activities to build rapport among group members, promote sobriety, and emphasize relapse prevention. Therapist promoted safe nonjudgmental environment by providing group members with unconditional positive regard and encouraging group members to comply with group rules and  guidelines. Therapist assisted group member with identifying and implementing healthier coping strategies.      Plan:  Continue [facility] IOP Phase II   Aftercare:  [facility] Phase III  Program Assignments:  Personal recovery plan, relapse prevention plan, attendance of recovery support group meetings, exploration of sponsorship, drug/alcohol screens.     Fabián Martinez LCSW  1/30/2025      Part of this note may be an electronic transcription/translation of spoken language to printed text using the Dragon Dictation System.

## 2025-02-03 ENCOUNTER — LAB (OUTPATIENT)
Dept: LAB | Facility: HOSPITAL | Age: 55
End: 2025-02-03
Payer: COMMERCIAL

## 2025-02-03 ENCOUNTER — OFFICE VISIT (OUTPATIENT)
Dept: PSYCHIATRY | Facility: CLINIC | Age: 55
End: 2025-02-03
Payer: COMMERCIAL

## 2025-02-03 DIAGNOSIS — F12.20 CANNABIS USE DISORDER, MODERATE, DEPENDENCE: ICD-10-CM

## 2025-02-03 DIAGNOSIS — F12.20 MODERATE CANNABIS USE DISORDER: ICD-10-CM

## 2025-02-03 DIAGNOSIS — F10.20 MODERATE ALCOHOL USE DISORDER: ICD-10-CM

## 2025-02-03 DIAGNOSIS — F10.20 ALCOHOL USE DISORDER, MODERATE, DEPENDENCE: ICD-10-CM

## 2025-02-03 DIAGNOSIS — F15.20 METHAMPHETAMINE USE DISORDER, MODERATE: ICD-10-CM

## 2025-02-03 DIAGNOSIS — F15.20 MODERATE METHAMPHETAMINE USE DISORDER: Primary | ICD-10-CM

## 2025-02-03 PROCEDURE — 90853 GROUP PSYCHOTHERAPY: CPT | Performed by: COUNSELOR

## 2025-02-03 PROCEDURE — 80306 DRUG TEST PRSMV INSTRMNT: CPT

## 2025-02-03 NOTE — PROGRESS NOTES
"[facility] PHASE II / Phase III GROUP NOTE    Name: Pieter William  Date: February 3, 2025    Time in:?3:30   Time out:?4:30   Participants: 07     Data: 1?hour group therapy session (Check ins, and fear/anxiety)     Clinical Maneuvering/Interventions:?Therapist utilized a person-centered, Motivation interviewing, and CBT approaches to build rapport, exploring and resolving ambivalence associated with commitment to change behaviors related to substance use with and addiction, group member.?Therapist implemented motivational interviewing techniques to assist client with, facilitate identification of maladaptive patterns of thinking and behavior that contribute to client's risk for continued substance use and relapse.?Therapist employed group interaction activities to build rapport among group members, promote sobriety, and emphasize relapse prevention.?Therapist promoted a safe nonjudgmental environment by providing group members with unconditional positive regard and encouraging group members to comply with group rules and guidelines. Therapist assisted group members with identifying and implementing healthier coping strategies.        Response:?Patient attended class in person. Patient participated in completion of check in form. Patient on check in form answered no to question \"currently or since last group meeting,?have you had any suicidal thoughts or plan or intent to hurt yourself”, and patient also answered no to question of \"currently or since your last group meeting, have you had any homicidal thoughts or plan or intent to hurt others\".      On a 1:10 Scale (0-none, 10-high):    Anxiety: 0  Depression: 0  Cravings: 0    Assessment     Diagnoses and all orders for this visit:    1. Moderate methamphetamine use disorder (Primary)    2. Moderate alcohol use disorder    3. Moderate cannabis use disorder             Progress toward goal: At goal    Functional Status: Moderate impairment     Prognosis: Good with " Ongoing Treatment     Mental Status Exam:   Hygiene:   good  Cooperation:  Cooperative  Eye Contact:  Good  Psychomotor Behavior:  Appropriate  Affect:  Appropriate  Mood: normal  Speech:  Normal  Thought Process:  Goal directed  Thought Content:  Mood congruent  Suicidal:  None  Homicidal:  None  Hallucinations:  None  Delusion:  None  Memory:  Intact  Orientation:  Person, Place, Time, and Situation  Reliability:  fair  Insight:  fair  Judgement:  fair  Impulse Control:  fair  Physical/Medical Issues:  No      Assessment:  Engaged in activity/Process and self-disclosed: Yes  Affect:Appropriate   Applies topic to self: Yes  Able to give and receive feedback: Yes    Urinalysis: Negative on preliminary result dated 2/3/2025  Labs:   Last Urine Toxicity  More data exists         Latest Ref Rng & Units 1/30/2025 1/20/2025   LAST URINE TOXICITY RESULTS   Amphetamine, Urine Qual Negative Negative  Negative    Barbiturates Screen, Urine Negative Negative  Negative    Benzodiazepine Screen, Urine Negative Negative  Negative    Buprenorphine, Screen, Urine Negative Negative  Negative    Cocaine Screen, Urine Negative Negative  Negative    Methadone Screen , Urine Negative Negative  Negative    Methamphetamine, Ur Negative Negative  Negative       Details                    Self-Reported days since last use: Patient check-in sheet reports 64 days    12-Step attendance: Patient check-in sheet reports zero 12-step meetings    Plan:   Patient will continue in weekly group psychotherapy sessions and individual outpatient psychotherapy sessions every 3-4 weeks and will continue in self-help meetings and seek additional treatment if necessary following completion.    Patient will continue in IOP Phase two and three group.      Safety plan has previously been discussed with patient.     TUCKER Rider  [unfilled]  16:48 EST

## 2025-02-05 LAB — REF LAB TEST METHOD: NORMAL

## 2025-02-06 ENCOUNTER — OFFICE VISIT (OUTPATIENT)
Dept: PSYCHIATRY | Facility: CLINIC | Age: 55
End: 2025-02-06
Payer: COMMERCIAL

## 2025-02-06 DIAGNOSIS — F12.20 MODERATE CANNABIS USE DISORDER: ICD-10-CM

## 2025-02-06 DIAGNOSIS — F15.20 MODERATE METHAMPHETAMINE USE DISORDER: Primary | ICD-10-CM

## 2025-02-06 DIAGNOSIS — F10.20 MODERATE ALCOHOL USE DISORDER: ICD-10-CM

## 2025-02-06 PROCEDURE — 90853 GROUP PSYCHOTHERAPY: CPT | Performed by: COUNSELOR

## 2025-02-06 NOTE — PROGRESS NOTES
"[facility] PHASE II / Phase III GROUP NOTE    Name: Pieter William  Date: February 6, 2025    Time in:?3:30  Time out:?4:30   Participants: 08     Data: 1?hour group therapy session (Check ins, and fear/anxiety)     Clinical Maneuvering/Interventions:?Therapist utilized a person-centered, Motivation interviewing, and CBT approaches to build rapport, exploring and resolving ambivalence associated with commitment to change behaviors related to substance use with and addiction, group member.?Therapist implemented motivational interviewing techniques to assist client with, facilitate identification of maladaptive patterns of thinking and behavior that contribute to client's risk for continued substance use and relapse.?Therapist employed group interaction activities to build rapport among group members, promote sobriety, and emphasize relapse prevention.?Therapist promoted a safe nonjudgmental environment by providing group members with unconditional positive regard and encouraging group members to comply with group rules and guidelines. Therapist assisted group members with identifying and implementing healthier coping strategies.        Response:?Patient attended class in person. Patient participated in completion of check in form. Patient on check in form answered no to question \"currently or since last group meeting,?have you had any suicidal thoughts or plan or intent to hurt yourself”, and patient also answered no to question of \"currently or since your last group meeting, have you had any homicidal thoughts or plan or intent to hurt others\".      On a 1:10 Scale (0-none, 10-high):    Anxiety: 0  Depression: 0  Cravings: 0    Assessment     Diagnoses and all orders for this visit:    1. Moderate methamphetamine use disorder (Primary)    2. Moderate alcohol use disorder    3. Moderate cannabis use disorder             Progress toward goal: At goal    Functional Status: Moderate impairment     Prognosis: Good with " Ongoing Treatment     Mental Status Exam:   Hygiene:   good  Cooperation:  Cooperative  Eye Contact:  Good  Psychomotor Behavior:  Appropriate  Affect:  Appropriate  Mood: normal  Speech:  Normal  Thought Process:  Linear  Thought Content:  Mood congruent  Suicidal:  None  Homicidal:  None  Hallucinations:  None  Delusion:  None  Memory:  Intact  Orientation:  Person, Place, Time, and Situation  Reliability:  fair  Insight:  fair  Judgement:  fair  Impulse Control:  fair  Physical/Medical Issues:  No      Assessment:  Engaged in activity/Process and self-disclosed: Yes  Affect:Appropriate   Applies topic to self: Yes  Able to give and receive feedback: Yes    Urinalysis: Negative  on preliminary result 02/03/2025   Labs:   Last Urine Toxicity  More data exists         Latest Ref Rng & Units 2/3/2025 1/30/2025   LAST URINE TOXICITY RESULTS   Amphetamine, Urine Qual Negative Negative  Negative    Barbiturates Screen, Urine Negative Negative  Negative    Benzodiazepine Screen, Urine Negative Negative  Negative    Buprenorphine, Screen, Urine Negative Negative  Negative    Cocaine Screen, Urine Negative Negative  Negative    Methadone Screen , Urine Negative Negative  Negative    Methamphetamine, Ur Negative Negative  Negative       Details                    Self-Reported days since last use: Patient check-in sheet reports 67 days    12-Step attendance: Patient check-in sheet reports zero 12-step meetings    Plan:   Patient will continue in weekly group psychotherapy sessions and individual outpatient psychotherapy sessions every 3-4 weeks and will continue in self-help meetings and seek additional treatment if necessary following completion.    Patient will continue in IOP Phase two and three group.      Safety plan has previously been discussed with patient.     Leno Pereyra [facility]  [unfilled]  15:56 EST

## 2025-02-10 ENCOUNTER — OFFICE VISIT (OUTPATIENT)
Dept: PSYCHIATRY | Facility: CLINIC | Age: 55
End: 2025-02-10
Payer: COMMERCIAL

## 2025-02-10 DIAGNOSIS — F10.20 MODERATE ALCOHOL USE DISORDER: ICD-10-CM

## 2025-02-10 DIAGNOSIS — F12.20 MODERATE CANNABIS USE DISORDER: ICD-10-CM

## 2025-02-10 DIAGNOSIS — F15.20 MODERATE METHAMPHETAMINE USE DISORDER: Primary | ICD-10-CM

## 2025-02-10 LAB — REF LAB TEST METHOD: NORMAL

## 2025-02-10 PROCEDURE — 90853 GROUP PSYCHOTHERAPY: CPT | Performed by: COUNSELOR

## 2025-02-11 NOTE — PROGRESS NOTES
"[facility] PHASE II / Phase III GROUP NOTE    Name: Pieter William  Date: February 10, 2025    Time in:?3:30   Time out:?4:30   Participants: 08     Data: 1?hour group therapy session (Check ins, just for today reading, and fear/anxiety)     Clinical Maneuvering/Interventions:?Therapist utilized a person-centered, Motivation interviewing, and CBT approaches to build rapport, exploring and resolving ambivalence associated with commitment to change behaviors related to substance use with and addiction, group member.?Therapist implemented motivational interviewing techniques to assist client with, facilitate identification of maladaptive patterns of thinking and behavior that contribute to client's risk for continued substance use and relapse.?Therapist employed group interaction activities to build rapport among group members, promote sobriety, and emphasize relapse prevention.?Therapist promoted a safe nonjudgmental environment by providing group members with unconditional positive regard and encouraging group members to comply with group rules and guidelines. Therapist assisted group members with identifying and implementing healthier coping strategies.        Response:?Patient attended class in person. Patient participated in completion of check in form. Patient on check in form answered no to question \"currently or since last group meeting,?have you had any suicidal thoughts or plan or intent to hurt yourself”, and patient also answered no to question of \"currently or since your last group meeting, have you had any homicidal thoughts or plan or intent to hurt others\".  Patient participated fully in group discussion by identifying some anxiety and fears that he is trying to overcome with sobriety.  Patient identified how he related to the just for the day reading.    On a 1:10 Scale (0-none, 10-high):    Anxiety: 0  Depression: 0  Cravings: 0    Assessment     Diagnoses and all orders for this visit:    1. Moderate " methamphetamine use disorder (Primary)    2. Moderate alcohol use disorder    3. Moderate cannabis use disorder             Progress toward goal: At goal    Functional Status: Moderate impairment     Prognosis: Good with Ongoing Treatment     Mental Status Exam:   Hygiene:   good  Cooperation:  Cooperative  Eye Contact:  Good  Psychomotor Behavior:  Appropriate  Affect:  Appropriate  Mood: normal  Speech:  Normal  Thought Process:  Linear  Thought Content:  Mood congruent  Suicidal:  None  Homicidal:  None  Hallucinations:  None  Delusion:  None  Memory:  Intact  Orientation:  Person, Place, Time, and Situation  Reliability:  fair  Insight:  fair  Judgement:  fair  Impulse Control:  fair  Physical/Medical Issues:  No      Assessment:  Engaged in activity/Process and self-disclosed: Yes  Affect:Appropriate   Applies topic to self: Yes  Able to give and receive feedback: Yes    Urinalysis: Negative on confirmation result dated 02/03/2025  Labs:   Last Urine Toxicity  More data exists         Latest Ref Rng & Units 2/3/2025 1/30/2025   LAST URINE TOXICITY RESULTS   Amphetamine, Urine Qual Negative Negative  Negative    Barbiturates Screen, Urine Negative Negative  Negative    Benzodiazepine Screen, Urine Negative Negative  Negative    Buprenorphine, Screen, Urine Negative Negative  Negative    Cocaine Screen, Urine Negative Negative  Negative    Methadone Screen , Urine Negative Negative  Negative    Methamphetamine, Ur Negative Negative  Negative       Details                    Self-Reported days since last use: Patient check-in sheet reports 71 days    12-Step attendance: Patient check-in sheet reports zero 12-step meetings    Plan:   Patient will continue in weekly group psychotherapy sessions and individual outpatient psychotherapy sessions every 3-4 weeks and will continue in self-help meetings and seek additional treatment if necessary following completion.    Patient will continue in IOP Phase two and three  group.      Safety plan has previously been discussed with patient.     TUCKER Rider  [unfilled]  09:09 EST

## 2025-02-13 ENCOUNTER — LAB (OUTPATIENT)
Dept: LAB | Facility: HOSPITAL | Age: 55
End: 2025-02-13
Payer: COMMERCIAL

## 2025-02-13 ENCOUNTER — OFFICE VISIT (OUTPATIENT)
Dept: PSYCHIATRY | Facility: CLINIC | Age: 55
End: 2025-02-13
Payer: COMMERCIAL

## 2025-02-13 DIAGNOSIS — F12.20 MODERATE CANNABIS USE DISORDER: ICD-10-CM

## 2025-02-13 DIAGNOSIS — F15.20 METHAMPHETAMINE USE DISORDER, MODERATE: ICD-10-CM

## 2025-02-13 DIAGNOSIS — F10.20 MODERATE ALCOHOL USE DISORDER: ICD-10-CM

## 2025-02-13 DIAGNOSIS — F10.20 ALCOHOL USE DISORDER, MODERATE, DEPENDENCE: ICD-10-CM

## 2025-02-13 DIAGNOSIS — F15.20 MODERATE METHAMPHETAMINE USE DISORDER: Primary | ICD-10-CM

## 2025-02-13 DIAGNOSIS — F12.20 CANNABIS USE DISORDER, MODERATE, DEPENDENCE: ICD-10-CM

## 2025-02-13 PROCEDURE — 80306 DRUG TEST PRSMV INSTRMNT: CPT

## 2025-02-13 PROCEDURE — 90853 GROUP PSYCHOTHERAPY: CPT | Performed by: COUNSELOR

## 2025-02-13 NOTE — PROGRESS NOTES
"[facility] PHASE II / Phase III GROUP NOTE    Name: Pieter William  Date: February 13, 2025    Time in:?3:30   Time out:?4:30   Participants: 08     Data: 1?hour group therapy session (Check ins, and anger management skills )     Clinical Maneuvering/Interventions:?Therapist utilized a person-centered, Motivation interviewing, and CBT approaches to build rapport, exploring and resolving ambivalence associated with commitment to change behaviors related to substance use with and addiction, group member.?Therapist implemented motivational interviewing techniques to assist client with, facilitate identification of maladaptive patterns of thinking and behavior that contribute to client's risk for continued substance use and relapse.?Therapist employed group interaction activities to build rapport among group members, promote sobriety, and emphasize relapse prevention.?Therapist promoted a safe nonjudgmental environment by providing group members with unconditional positive regard and encouraging group members to comply with group rules and guidelines. Therapist assisted group members with identifying and implementing healthier coping strategies.        Response:?Patient attended class in person. Patient participated in completion of check in form. Patient on check in form answered no to question \"currently or since last group meeting,?have you had any suicidal thoughts or plan or intent to hurt yourself”, and patient also answered no to question of \"currently or since your last group meeting, have you had any homicidal thoughts or plan or intent to hurt others\".      On a 1:10 Scale (0-none, 10-high):    Anxiety: 0  Depression: 0  Cravings: 0    Assessment     Diagnoses and all orders for this visit:    1. Moderate methamphetamine use disorder (Primary)    2. Moderate alcohol use disorder    3. Moderate cannabis use disorder             Progress toward goal: At goal    Functional Status: Moderate impairment "     Prognosis: Good with Ongoing Treatment     Mental Status Exam:   Hygiene:   good  Cooperation:  Cooperative  Eye Contact:  Good  Psychomotor Behavior:  Appropriate  Affect:  Appropriate  Mood: normal  Speech:  Normal  Thought Process:  Linear  Thought Content:  Mood congruent  Suicidal:  None  Homicidal:  None  Hallucinations:  None  Delusion:  None  Memory:  Intact  Orientation:  Person, Place, Time, and Situation  Reliability:  fair  Insight:  fair  Judgement:  fair  Impulse Control:  fair  Physical/Medical Issues:  No      Assessment:  Engaged in activity/Process and self-disclosed: Yes  Affect:Appropriate   Applies topic to self: Yes  Able to give and receive feedback: Yes    Urinalysis: Negative on confirmation result dated 2/13/2025  Labs:   Last Urine Toxicity  More data exists         Latest Ref Rng & Units 2/3/2025 1/30/2025   LAST URINE TOXICITY RESULTS   Amphetamine, Urine Qual Negative Negative  Negative    Barbiturates Screen, Urine Negative Negative  Negative    Benzodiazepine Screen, Urine Negative Negative  Negative    Buprenorphine, Screen, Urine Negative Negative  Negative    Cocaine Screen, Urine Negative Negative  Negative    Methadone Screen , Urine Negative Negative  Negative    Methamphetamine, Ur Negative Negative  Negative       Details                    Self-Reported days since last use: Patient check-in sheet reports 74 days    12-Step attendance: Patient check-in sheet reports one 12-step meeting    Plan:   Patient will continue in weekly group psychotherapy sessions and individual outpatient psychotherapy sessions every 3-4 weeks and will continue in self-help meetings and seek additional treatment if necessary following completion.    Patient will continue in IOP Phase two and three group.      Safety plan has previously been discussed with patient.     TUCKER Rider  [unfilled]  16:05 EST

## 2025-02-18 LAB — REF LAB TEST METHOD: NORMAL

## 2025-02-20 ENCOUNTER — OFFICE VISIT (OUTPATIENT)
Dept: PSYCHIATRY | Facility: CLINIC | Age: 55
End: 2025-02-20
Payer: COMMERCIAL

## 2025-02-20 ENCOUNTER — LAB (OUTPATIENT)
Dept: LAB | Facility: HOSPITAL | Age: 55
End: 2025-02-20
Payer: COMMERCIAL

## 2025-02-20 DIAGNOSIS — F15.20 METHAMPHETAMINE USE DISORDER, MODERATE: ICD-10-CM

## 2025-02-20 DIAGNOSIS — F10.20 ALCOHOL USE DISORDER, MODERATE, DEPENDENCE: ICD-10-CM

## 2025-02-20 DIAGNOSIS — F12.20 MODERATE CANNABIS USE DISORDER: ICD-10-CM

## 2025-02-20 DIAGNOSIS — F12.20 CANNABIS USE DISORDER, MODERATE, DEPENDENCE: ICD-10-CM

## 2025-02-20 DIAGNOSIS — F15.20 MODERATE METHAMPHETAMINE USE DISORDER: Primary | ICD-10-CM

## 2025-02-20 DIAGNOSIS — F10.20 MODERATE ALCOHOL USE DISORDER: ICD-10-CM

## 2025-02-20 PROCEDURE — 90853 GROUP PSYCHOTHERAPY: CPT | Performed by: COUNSELOR

## 2025-02-20 PROCEDURE — 80306 DRUG TEST PRSMV INSTRMNT: CPT

## 2025-02-20 NOTE — PROGRESS NOTES
"[facility] PHASE II / Phase III GROUP NOTE    Name: Pieter William  Date: February 20, 2025    Time in:?3:30   Time out:?4:30   Participants: 07     Data: 1?hour group therapy session (Check ins, and coping skills applications)     Clinical Maneuvering/Interventions:?Therapist utilized a person-centered, Motivation interviewing, and CBT approaches to build rapport, exploring and resolving ambivalence associated with commitment to change behaviors related to substance use with and addiction, group member.?Therapist implemented motivational interviewing techniques to assist client with, facilitate identification of maladaptive patterns of thinking and behavior that contribute to client's risk for continued substance use and relapse.?Therapist employed group interaction activities to build rapport among group members, promote sobriety, and emphasize relapse prevention.?Therapist promoted a safe nonjudgmental environment by providing group members with unconditional positive regard and encouraging group members to comply with group rules and guidelines. Therapist assisted group members with identifying and implementing healthier coping strategies.        Response:?Patient attended class in person. Patient participated in completion of check in form. Patient on check in form answered no to question \"currently or since last group meeting,?have you had any suicidal thoughts or plan or intent to hurt yourself”, and patient also answered no to question of \"currently or since your last group meeting, have you had any homicidal thoughts or plan or intent to hurt others\".      On a 1:10 Scale (0-none, 10-high):    Anxiety: 0  Depression: 0  Cravings: 0    Assessment     Diagnoses and all orders for this visit:    1. Moderate methamphetamine use disorder (Primary)    2. Moderate alcohol use disorder    3. Moderate cannabis use disorder             Progress toward goal: At goal    Functional Status: Moderate impairment "     Prognosis: Good with Ongoing Treatment     Mental Status Exam:   Hygiene:   good  Cooperation:  Cooperative  Eye Contact:  Good  Psychomotor Behavior:  Appropriate  Affect:  Appropriate  Mood: normal  Speech:  Normal  Thought Process:  Linear  Thought Content:  Normal  Suicidal:  None  Homicidal:  None  Hallucinations:  None  Delusion:  None  Memory:  Intact  Orientation:  Person, Place, Time, and Situation  Reliability:  fair  Insight:  fair  Judgement:  fair  Impulse Control:  fair  Physical/Medical Issues:  No      Assessment:  Engaged in activity/Process and self-disclosed: Yes  Affect:Appropriate   Applies topic to self: Yes  Able to give and receive feedback: Yes    Urinalysis: Negative on preliminary result dated 2/20/2025  Labs:   Last Urine Toxicity  More data exists         Latest Ref Rng & Units 2/13/2025 2/3/2025   LAST URINE TOXICITY RESULTS   Amphetamine, Urine Qual Negative Negative  Negative    Barbiturates Screen, Urine Negative Negative  Negative    Benzodiazepine Screen, Urine Negative Negative  Negative    Buprenorphine, Screen, Urine Negative Negative  Negative    Cocaine Screen, Urine Negative Negative  Negative    Methadone Screen , Urine Negative Negative  Negative    Methamphetamine, Ur Negative Negative  Negative       Details                    Self-Reported days since last use: Patient check-in sheet reports 81 days    12-Step attendance: Patient check-in sheet reports one 12-step meeting    Plan:   Patient will continue in weekly group psychotherapy sessions and individual outpatient psychotherapy sessions every 3-4 weeks and will continue in self-help meetings and seek additional treatment if necessary following completion.    Patient will continue in IOP Phase two and three group.      Safety plan has previously been discussed with patient.     TUCKER Rider  [unfilled]  17:43 EST

## 2025-02-26 LAB — REF LAB TEST METHOD: NORMAL

## 2025-02-27 ENCOUNTER — LAB (OUTPATIENT)
Dept: LAB | Facility: HOSPITAL | Age: 55
End: 2025-02-27
Payer: COMMERCIAL

## 2025-02-27 ENCOUNTER — OFFICE VISIT (OUTPATIENT)
Dept: PSYCHIATRY | Facility: CLINIC | Age: 55
End: 2025-02-27
Payer: COMMERCIAL

## 2025-02-27 DIAGNOSIS — F12.20 MODERATE CANNABIS USE DISORDER: ICD-10-CM

## 2025-02-27 DIAGNOSIS — F10.20 ALCOHOL USE DISORDER, MODERATE, DEPENDENCE: ICD-10-CM

## 2025-02-27 DIAGNOSIS — F12.20 CANNABIS USE DISORDER, MODERATE, DEPENDENCE: ICD-10-CM

## 2025-02-27 DIAGNOSIS — F15.20 MODERATE METHAMPHETAMINE USE DISORDER: Primary | ICD-10-CM

## 2025-02-27 DIAGNOSIS — F10.20 MODERATE ALCOHOL USE DISORDER: ICD-10-CM

## 2025-02-27 DIAGNOSIS — F15.20 METHAMPHETAMINE USE DISORDER, MODERATE: ICD-10-CM

## 2025-02-27 PROCEDURE — 90853 GROUP PSYCHOTHERAPY: CPT | Performed by: COUNSELOR

## 2025-02-27 PROCEDURE — 80306 DRUG TEST PRSMV INSTRMNT: CPT

## 2025-02-27 NOTE — PROGRESS NOTES
"[facility] PHASE II / Phase III GROUP NOTE    Name: Pieter William  Date: February 27, 2025    Time in:?3:30   Time out:?4:30   Participants: 09     Data: 1?hour group therapy session (Check ins, davida talk about recovery story and coping skills applications)     Clinical Maneuvering/Interventions:?Therapist utilized a person-centered, Motivation interviewing, and CBT approaches to build rapport, exploring and resolving ambivalence associated with commitment to change behaviors related to substance use with and addiction, group member.?Therapist implemented motivational interviewing techniques to assist client with, facilitate identification of maladaptive patterns of thinking and behavior that contribute to client's risk for continued substance use and relapse.?Therapist employed group interaction activities to build rapport among group members, promote sobriety, and emphasize relapse prevention.?Therapist promoted a safe nonjudgmental environment by providing group members with unconditional positive regard and encouraging group members to comply with group rules and guidelines. Therapist assisted group members with identifying healthy coping skills to protect against relapse drift.        Response:?Patient attended class in person. Patient participated in completion of check in form. Patient on check in form answered no to question \"currently or since last group meeting,?have you had any suicidal thoughts or plan or intent to hurt yourself”, and patient also answered no to question of \"currently or since your last group meeting, have you had any homicidal thoughts or plan or intent to hurt others\".  Patient identified coping skills they find beneficial.    On a 1:10 Scale (0-none, 10-high):    Anxiety: 0  Depression: 0  Cravings: 0    Assessment     Diagnoses and all orders for this visit:    1. Moderate methamphetamine use disorder (Primary)    2. Moderate alcohol use disorder    3. Moderate cannabis use " disorder             Progress toward goal: At goal    Functional Status: Moderate impairment     Prognosis: Good with Ongoing Treatment     Mental Status Exam:   Hygiene:   good  Cooperation:  Cooperative  Eye Contact:  Good  Psychomotor Behavior:  Appropriate  Affect:  Appropriate  Mood: normal  Speech:  Normal  Thought Process:  Linear  Thought Content:  Normal  Suicidal:  None  Homicidal:  None  Hallucinations:  None  Delusion:  None  Memory:  Intact  Orientation:  Person, Place, Time, and Situation  Reliability:  fair  Insight:  fair  Judgement:  fair  Impulse Control:  fair  Physical/Medical Issues:  No      Assessment:  Engaged in activity/Process and self-disclosed: Yes  Affect:Appropriate   Applies topic to self: Yes  Able to give and receive feedback: Yes    Urinalysis: Negative on preliminary result dated 2/27/2025  Labs:   Last Urine Toxicity  More data exists         Latest Ref Rng & Units 2/20/2025 2/13/2025   LAST URINE TOXICITY RESULTS   Amphetamine, Urine Qual Negative Negative  Negative    Barbiturates Screen, Urine Negative Negative  Negative    Benzodiazepine Screen, Urine Negative Negative  Negative    Buprenorphine, Screen, Urine Negative Negative  Negative    Cocaine Screen, Urine Negative Negative  Negative    Methadone Screen , Urine Negative Negative  Negative    Methamphetamine, Ur Negative Negative  Negative       Details                    Self-Reported days since last use: Patient check-in sheet reports 88 days    12-Step attendance: Patient check-in sheet reports one 12-step meeting    Plan:   Patient will continue in weekly group psychotherapy sessions and individual outpatient psychotherapy sessions every 3-4 weeks and will continue in self-help meetings and seek additional treatment if necessary following completion.    Patient will continue in IOP Phase two and three group.      Safety plan has previously been discussed with patient.     TUCKER Rider  [unfilled]  18:00 EST

## 2025-03-06 ENCOUNTER — LAB (OUTPATIENT)
Dept: LAB | Facility: HOSPITAL | Age: 55
End: 2025-03-06
Payer: COMMERCIAL

## 2025-03-06 ENCOUNTER — OFFICE VISIT (OUTPATIENT)
Dept: PSYCHIATRY | Facility: CLINIC | Age: 55
End: 2025-03-06
Payer: COMMERCIAL

## 2025-03-06 DIAGNOSIS — F15.20 METHAMPHETAMINE USE DISORDER, MODERATE: ICD-10-CM

## 2025-03-06 DIAGNOSIS — F15.20 MODERATE METHAMPHETAMINE USE DISORDER: Primary | ICD-10-CM

## 2025-03-06 DIAGNOSIS — F10.20 ALCOHOL USE DISORDER, MODERATE, DEPENDENCE: ICD-10-CM

## 2025-03-06 DIAGNOSIS — F12.20 MODERATE CANNABIS USE DISORDER: ICD-10-CM

## 2025-03-06 DIAGNOSIS — F12.20 CANNABIS USE DISORDER, MODERATE, DEPENDENCE: ICD-10-CM

## 2025-03-06 DIAGNOSIS — F10.20 MODERATE ALCOHOL USE DISORDER: ICD-10-CM

## 2025-03-06 PROCEDURE — 80306 DRUG TEST PRSMV INSTRMNT: CPT

## 2025-03-06 PROCEDURE — 90853 GROUP PSYCHOTHERAPY: CPT | Performed by: COUNSELOR

## 2025-03-06 NOTE — PROGRESS NOTES
"[facility] PHASE II / Phase III GROUP NOTE    Name: Pieter William  Date: March 6, 2025    Time in:?3:30   Time out:?4:30   Participants: 09     Data: 1?hour group therapy session (Check ins and an activity call use your brain and not your pain)     Clinical Maneuvering/Interventions:?Therapist utilized a person-centered, Motivation interviewing, and CBT approaches to build rapport, exploring and resolving ambivalence associated with commitment to change behaviors related to substance use with and addiction, group member.?Therapist implemented motivational interviewing techniques to assist client with, facilitate identification of maladaptive patterns of thinking and behavior that contribute to client's risk for continued substance use and relapse.?Therapist employed group interaction activities to build rapport among group members, promote sobriety, and emphasize relapse prevention.?Therapist promoted a safe nonjudgmental environment by providing group members with unconditional positive regard and encouraging group members to comply with group rules and guidelines. Therapist assisted group members with identifying core values and how they influence behavior.     Response:?Patient attended class in person. Patient participated in completion of check in form. Patient on check in form answered no to question \"currently or since last group meeting,?have you had any suicidal thoughts or plan or intent to hurt yourself”, and patient also answered no to question of \"currently or since your last group meeting, have you had any homicidal thoughts or plan or intent to hurt others\".  Patient able to identify what core values they experience in sobriety.     On a 1:10 Scale (0-none, 10-high):    Anxiety: 0  Depression: 0  Cravings: 0    Assessment     Diagnoses and all orders for this visit:    1. Moderate methamphetamine use disorder (Primary)    2. Moderate alcohol use disorder    3. Moderate cannabis use disorder         "     Progress toward goal: At goal    Functional Status: Moderate impairment     Prognosis: Good with Ongoing Treatment     Mental Status Exam:   Hygiene:   good  Cooperation:  Cooperative  Eye Contact:  Good  Psychomotor Behavior:  Appropriate  Affect:  Appropriate  Mood: normal  Speech:  Normal  Thought Process:  Linear  Thought Content:  Normal  Suicidal:  None  Homicidal:  None  Hallucinations:  None  Delusion:  None  Memory:  Intact  Orientation:  Person, Place, Time, and Situation  Reliability:  fair  Insight:  fair  Judgement:  fair  Impulse Control:  fair  Physical/Medical Issues:  No      Assessment:  Engaged in activity/Process and self-disclosed: Yes  Affect:Appropriate   Applies topic to self: Yes  Able to give and receive feedback: Yes    Urinalysis: Negative on preliminary results dated 03/06/2025.  Labs:   Last Urine Toxicity  More data exists         Latest Ref Rng & Units 3/6/2025 2/27/2025   LAST URINE TOXICITY RESULTS   Amphetamine, Urine Qual Negative Negative  Negative    Barbiturates Screen, Urine Negative Negative  Negative    Benzodiazepine Screen, Urine Negative Negative  Negative    Buprenorphine, Screen, Urine Negative Negative  Negative    Cocaine Screen, Urine Negative Negative  Negative    Methadone Screen , Urine Negative Negative  Negative    Methamphetamine, Ur Negative Negative  Negative       Details                    Self-Reported days since last use: Patient check-in sheet reports 95 days    12-Step attendance: Patient check-in sheet reports zero 12-step meetings    Plan:   Patient will continue in weekly group psychotherapy sessions and individual outpatient psychotherapy sessions every 3-4 weeks and will continue in self-help meetings and seek additional treatment if necessary following completion.    Patient will continue in IOP Phase two and three group.      Safety plan has previously been discussed with patient.     TUCKER Rider  [unfilled]  17:52 EST

## 2025-03-10 LAB — REF LAB TEST METHOD: NORMAL

## 2025-03-13 ENCOUNTER — OFFICE VISIT (OUTPATIENT)
Dept: PSYCHIATRY | Facility: CLINIC | Age: 55
End: 2025-03-13
Payer: COMMERCIAL

## 2025-03-13 ENCOUNTER — LAB (OUTPATIENT)
Dept: LAB | Facility: HOSPITAL | Age: 55
End: 2025-03-13
Payer: COMMERCIAL

## 2025-03-13 DIAGNOSIS — F12.20 MODERATE CANNABIS USE DISORDER: ICD-10-CM

## 2025-03-13 DIAGNOSIS — F12.20 CANNABIS USE DISORDER, MODERATE, DEPENDENCE: ICD-10-CM

## 2025-03-13 DIAGNOSIS — F10.20 ALCOHOL USE DISORDER, MODERATE, DEPENDENCE: ICD-10-CM

## 2025-03-13 DIAGNOSIS — F15.20 MODERATE METHAMPHETAMINE USE DISORDER: Primary | ICD-10-CM

## 2025-03-13 DIAGNOSIS — F10.20 MODERATE ALCOHOL USE DISORDER: ICD-10-CM

## 2025-03-13 DIAGNOSIS — F15.20 METHAMPHETAMINE USE DISORDER, MODERATE: ICD-10-CM

## 2025-03-13 PROCEDURE — 80306 DRUG TEST PRSMV INSTRMNT: CPT

## 2025-03-13 PROCEDURE — 90853 GROUP PSYCHOTHERAPY: CPT | Performed by: COUNSELOR

## 2025-03-13 NOTE — PROGRESS NOTES
"[facility] PHASE II / Phase III GROUP NOTE    Name: Pieter William  Date: March 13, 2025    Time in:?3:30   Time out:?4:30   Participants: 08     Data: 1?hour group therapy session (Check ins, values activity, and boundaries)     Clinical Maneuvering/Interventions:?Therapist utilized a person-centered, Motivation interviewing, and CBT approaches to build rapport, exploring and resolving ambivalence associated with commitment to change behaviors related to substance use with and addiction, group member.?Therapist implemented motivational interviewing techniques to assist client with, facilitate identification of maladaptive patterns of thinking and behavior that contribute to client's risk for continued substance use and relapse.?Therapist employed group interaction activities to build rapport among group members, promote sobriety, and emphasize relapse prevention.?Therapist promoted a safe nonjudgmental environment by providing group members with unconditional positive regard and encouraging group members to comply with group rules and guidelines. Therapist assisted group members with identifying core values and their importance for maintaining healthy boundaries in sobriety.     Response:?Patient attended class in person. Patient participated in completion of check in form. Patient on check in form answered no to question \"currently or since last group meeting,?have you had any suicidal thoughts or plan or intent to hurt yourself”, and patient also answered no to question of \"currently or since your last group meeting, have you had any homicidal thoughts or plan or intent to hurt others\".  Patient able to identify core values and which boundaries are important for them, personally.     On a 1:10 Scale (0-none, 10-high):    Anxiety: 0  Depression: 0  Cravings: 0    Assessment     Diagnoses and all orders for this visit:    1. Moderate methamphetamine use disorder (Primary)    2. Moderate alcohol use disorder    3. " Moderate cannabis use disorder             Progress toward goal: At goal    Functional Status: Moderate impairment     Prognosis: Good with Ongoing Treatment     Mental Status Exam:   Hygiene:   good  Cooperation:  Cooperative  Eye Contact:  Good  Psychomotor Behavior:  Appropriate  Affect:  Appropriate  Mood: normal  Speech:  Normal  Thought Process:  Linear  Thought Content:  Normal  Suicidal:  None  Homicidal:  None  Hallucinations:  None  Delusion:  None  Memory:  Intact  Orientation:  Person, Place, Time, and Situation  Reliability:  fair  Insight:  fair  Judgement:  fair  Impulse Control:  fair  Physical/Medical Issues:  No      Assessment:  Engaged in activity/Process and self-disclosed: Yes  Affect:Appropriate   Applies topic to self: Yes  Able to give and receive feedback: Yes    Urinalysis: Negative on preliminary result 03/13/2025  Labs:   Last Urine Toxicity  More data exists         Latest Ref Rng & Units 3/6/2025 2/27/2025   LAST URINE TOXICITY RESULTS   Amphetamine, Urine Qual Negative Negative  Negative    Barbiturates Screen, Urine Negative Negative  Negative    Benzodiazepine Screen, Urine Negative Negative  Negative    Buprenorphine, Screen, Urine Negative Negative  Negative    Cocaine Screen, Urine Negative Negative  Negative    Methadone Screen , Urine Negative Negative  Negative    Methamphetamine, Ur Negative Negative  Negative         Self-Reported days since last use: Patient check-in sheet reports 102 days    12-Step attendance: Patient check-in sheet reports zero 12-step meetings    Plan:   Patient will continue in weekly group psychotherapy sessions and individual outpatient psychotherapy sessions every 3-4 weeks and will continue in self-help meetings and seek additional treatment if necessary following completion.    Patient will continue in IOP Phase two and three group.      Safety plan has previously been discussed with patient.     TUCKER Rider  [unfilled]  16:04 EDT   0.75

## 2025-03-17 LAB — REF LAB TEST METHOD: NORMAL

## 2025-03-20 ENCOUNTER — OFFICE VISIT (OUTPATIENT)
Dept: PSYCHIATRY | Facility: CLINIC | Age: 55
End: 2025-03-20
Payer: COMMERCIAL

## 2025-03-20 ENCOUNTER — LAB (OUTPATIENT)
Dept: LAB | Facility: HOSPITAL | Age: 55
End: 2025-03-20
Payer: COMMERCIAL

## 2025-03-20 DIAGNOSIS — F12.20 MODERATE CANNABIS USE DISORDER: ICD-10-CM

## 2025-03-20 DIAGNOSIS — F10.20 ALCOHOL USE DISORDER, MODERATE, DEPENDENCE: ICD-10-CM

## 2025-03-20 DIAGNOSIS — F15.20 MODERATE METHAMPHETAMINE USE DISORDER: Primary | ICD-10-CM

## 2025-03-20 DIAGNOSIS — F12.20 CANNABIS USE DISORDER, MODERATE, DEPENDENCE: ICD-10-CM

## 2025-03-20 DIAGNOSIS — F15.20 METHAMPHETAMINE USE DISORDER, MODERATE: ICD-10-CM

## 2025-03-20 DIAGNOSIS — F10.20 MODERATE ALCOHOL USE DISORDER: ICD-10-CM

## 2025-03-20 PROCEDURE — 80306 DRUG TEST PRSMV INSTRMNT: CPT

## 2025-03-20 PROCEDURE — 90853 GROUP PSYCHOTHERAPY: CPT | Performed by: COUNSELOR

## 2025-03-20 NOTE — PROGRESS NOTES
"[facility] PHASE II / Phase III GROUP NOTE    Name: Pieter William  Date: March 20, 2025    Time in:?3:30   Time out:?4:30   Participants: 08     Data: 1?hour group therapy session (Check ins, and goal setting)     Clinical Maneuvering/Interventions:?Therapist utilized a person-centered, Motivation interviewing, and CBT approaches to build rapport, exploring and resolving ambivalence associated with commitment to change behaviors related to substance use with and addiction, group member.?Therapist implemented motivational interviewing techniques to assist client with, facilitate identification of maladaptive patterns of thinking and behavior that contribute to client's risk for continued substance use and relapse.?Therapist employed group interaction activities to build rapport among group members, promote sobriety, and emphasize relapse prevention.?Therapist promoted a safe nonjudgmental environment by providing group members with unconditional positive regard and encouraging group members to comply with group rules and guidelines. Therapist facilitated discussion on goals identification for different areas of life.     Response:?Patient attended class in person. Patient participated in completion of check in form. Patient on check in form answered no to question \"currently or since last group meeting,?have you had any suicidal thoughts or plan or intent to hurt yourself”, and patient also answered no to question of \"currently or since your last group meeting, have you had any homicidal thoughts or plan or intent to hurt others\". Patients receive resources that are in the community. Patient identified some goals they are working on in their life.     On a 1:10 Scale (0-none, 10-high):    Anxiety: 0  Depression: 0  Cravings: 0    Assessment     Diagnoses and all orders for this visit:    1. Moderate methamphetamine use disorder (Primary)    2. Moderate alcohol use disorder    3. Moderate cannabis use " disorder             Progress toward goal: At goal    Functional Status: Moderate impairment     Prognosis: Good with Ongoing Treatment     Mental Status Exam:   Hygiene:   good  Cooperation:  Cooperative  Eye Contact:  Good  Psychomotor Behavior:  Appropriate  Affect:  Appropriate  Mood: normal  Speech:  Normal  Thought Process:  Linear  Thought Content:  Normal  Suicidal:  None  Homicidal:  None  Hallucinations:  None  Delusion:  None  Memory:  Intact  Orientation:  Person, Place, Time, and Situation  Reliability:  fair  Insight:  fair  Judgement:  fair  Impulse Control:  fair  Physical/Medical Issues:  No      Assessment:  Engaged in activity/Process and self-disclosed: Yes  Affect:Appropriate   Applies topic to self: Yes  Able to give and receive feedback: Yes    Urinalysis: Negative  on preliminary result dated 03/20/2025  Labs:   Last Urine Toxicity  More data exists         Latest Ref Rng & Units 3/13/2025 3/6/2025   LAST URINE TOXICITY RESULTS   Amphetamine, Urine Qual Negative Negative  Negative    Barbiturates Screen, Urine Negative Negative  Negative    Benzodiazepine Screen, Urine Negative Negative  Negative    Buprenorphine, Screen, Urine Negative Negative  Negative    Cocaine Screen, Urine Negative Negative  Negative    Methadone Screen , Urine Negative Negative  Negative    Methamphetamine, Ur Negative Negative  Negative         Self-Reported days since last use: Patient check-in sheet reports 110 days    12-Step attendance: Patient check-in sheet reports one 12-step meeting    Plan:   Patient will continue in weekly group psychotherapy sessions and individual outpatient psychotherapy sessions every 3-4 weeks and will continue in self-help meetings and seek additional treatment if necessary following completion.    Patient will continue in IOP Phase two and three group.      Safety plan has previously been discussed with patient.     TUCKER Rider  [unfilled]  16:09 EDT

## 2025-03-23 LAB — REF LAB TEST METHOD: NORMAL

## 2025-03-24 LAB
6-ACETYLMORPHINE: NOT DETECTED NG/ML
ALCOHOL, ETHYL: NOT DETECTED NG/ML
AMPHETAMINE: NOT DETECTED NG/ML
BENZODIAZ BLD QL: NOT DETECTED NG/ML
BUPRENORPHINE SAL CFM-MCNC: NOT DETECTED NG/ML
COCAINE METABOLITE: NOT DETECTED NG/ML
EDDP SERPL QL: NOT DETECTED NG/ML
FENTANYL-EIA: NOT DETECTED NG/ML
METHAMPHETAMINE: NOT DETECTED NG/ML
OPIATES: NOT DETECTED NG/ML
OXYCODONE: NOT DETECTED NG/ML
PCP: NOT DETECTED NG/ML
THC: NOT DETECTED NG/ML
TRICYCLIC ANTIDEPRESSANTS: NOT DETECTED NG/ML

## 2025-03-27 ENCOUNTER — DOCUMENTATION (OUTPATIENT)
Dept: PSYCHIATRY | Facility: CLINIC | Age: 55
End: 2025-03-27
Payer: COMMERCIAL

## 2025-03-27 ENCOUNTER — TELEPHONE (OUTPATIENT)
Dept: PSYCHIATRY | Facility: CLINIC | Age: 55
End: 2025-03-27
Payer: COMMERCIAL

## 2025-03-27 NOTE — PROGRESS NOTES
[facility] [address], [address]  [phone]    CHEMICAL DEPENDENCY   INTENSIVE OUTPATIENT PROGRAM    PHASE II and III   DISCHARGE SUMMARY        ATTENDING: SANCHEZ Riddle   THERAPIST: ANDREW Rider           DATE OF ADMISSION: 07/18/2024   DATE OF DISCHARGE: 03/20/2025     REASON FOR ADMISSION: Pieter William was referred by self and admitted to IOP Phase 1 for Moderate alcohol use disorder, Moderate cannabis use disorder, and Moderate methamphetamine use disorder.     SUMMARY OF CARE, TREATMENT, and SERVICES PROVIDED: Pieter William was assessed and determined to meet phase 1 of CD-IOP level of care on 07/18/2024. Patient began phase 1 of CD-IOP on 08/05/2024 and attended 08 classes with 03 excused absences due to insurance complications. Pt began phase 2 of CD-IOP on 09/05/2024 and attended 42 classes with 03 absences. Pt began phase 3 of CD-IOP on 02/13/2025 and attended a total of 6 classes with 0 absence for a grand total of 56 classes.     Since starting phase 2, Patient tested positive for:     Methamphetamine and amphetamine on the following dates: 09/05/2024, 01/02/2025     Patient tested positive for Amphetamine on the dates listed: 12/02/2024     Patient did not complete a UDS on the following date 09/23/2024     AFTERCARE PLAN: Pieter William completed all requirements of CD-IOP successfully and was discharged on 03/20/2025. Pieter William was encouraged to continue AA, NA, reach out to peer support, and outpatient therapy as needed.       Current Outpatient Medications:     cyclobenzaprine (FLEXERIL) 10 MG tablet, Take 1 tablet by mouth 3 (Three) Times a Day As Needed for Muscle Spasms., Disp: 21 tablet, Rfl: 0     [unfilled]    PROGNOSIS: fair with continued care